# Patient Record
Sex: MALE | Race: WHITE | NOT HISPANIC OR LATINO | Employment: FULL TIME | URBAN - METROPOLITAN AREA
[De-identification: names, ages, dates, MRNs, and addresses within clinical notes are randomized per-mention and may not be internally consistent; named-entity substitution may affect disease eponyms.]

---

## 2017-10-13 ENCOUNTER — ALLSCRIPTS OFFICE VISIT (OUTPATIENT)
Dept: OTHER | Facility: OTHER | Age: 52
End: 2017-10-13

## 2017-10-14 NOTE — PROGRESS NOTES
Assessment  1  Urticaria (708 9) (L50 9)    Plan  Urticaria    · PredniSONE 20 MG Oral Tablet; TAKE 1 TABLET TWICE DAILY AFTER MEALS    Discussion/Summary    Zyrtec 10 mg a m    Benadryl 25 mg at as needed prednisone 20 mg twice a day for 5 days  Follow up in 1 week if no better  Advised to call if any changes  History of Present Illness  HPI: 5 day history of pruritic rash alleviated with Benadryl improved in a m  worsening as the day goes on  No recent illnesses no antibiotics  No changes in soaps or detergents  Current medications reviewed  labs 11/2016 see note  Review of Systems    Constitutional: no fever-- and-- no chills  ENT: no earache,-- no sore throat-- and-- no nasal discharge  Cardiovascular: no chest pain-- and-- no palpitations  Respiratory: no shortness of breath,-- no cough-- and-- no wheezing  Gastrointestinal: no nausea,-- no vomiting-- and-- no diarrhea  Musculoskeletal: no arthralgias-- and-- no myalgias  Neurological: no headache  Active Problems  1  Dyslipidemia (272 4) (E78 5)   2  Eczema (692 9) (L30 9)   3  Esophageal reflux (530 81) (K21 9)   4  Myofascial pain syndrome (729 1) (M79 1)   5  Rosacea (695 3) (L71 9)   6  Sleep disturbances (780 50) (G47 9)    Past Medical History  1  History of Gilbert's syndrome (277 4) (E80 4)   2  History of myocardial infarction (412) (I25 2)   3  History of snoring (V15 89) (Z87 898)   4  History of Inguinal hernia (550 90) (K40 90)   5  History of Mononucleosis (075) (B27 90)    Family History  Mother    1  Family history of hypertension (V17 49) (Z82 49)   2  Family history of Polyps Of The Sigmoid Colon  Father    3  Family history of Basal Cell Carcinoma Of The Skin  Sister    4  Family history of Breast Cancer (V16 3)  Brother    5  Family history of Basal Cell Carcinoma Of The Skin   6  Family history of asthma (V17 5) (Z82 5)    Social History   · Never A Smoker   · Occasional alcohol use    Surgical History  1   History of Cholecystectomy   2  History of Hernia Repair    Current Meds   1  Aspirin 81 MG TABS; Take 1 tablet daily Recorded   2  Elidel 1 % External Cream; APPLY THIN FILM TO AFFECTED AREA(S) ONCE DAILY; Therapy: 96QZH8873 to (Levon Porter)  Requested for: 90Fdq8335; Last   Rx:[id number] Ordered   3  PriLOSEC 20 MG CPDR; Therapy: (Recorded:97Dve8081) to Recorded    Allergies  1  No Known Drug Allergies    Vitals   Recorded: 83ZCB4868 08:33AM   Temperature 98 2 F   Heart Rate 82   Respiration 16   Systolic 275   Diastolic 76   Height 5 ft 9 in   Weight 211 lb    BMI Calculated 31 16   BSA Calculated 2 11     Physical Exam    Constitutional   General appearance: No acute distress, well appearing and well nourished  Eyes   Conjunctiva and lids: No erythema, swelling or discharge  Ears, Nose, Mouth, and Throat   Otoscopic examination: Tympanic membranes translucent with normal light reflex  Canals patent without erythema  Lips, teeth, and gums: Normal, good dentition  Oropharynx: Normal with no erythema, edema, exudate or lesions  Neck   Neck: Supple, symmetric, trachea midline, no masses  Thyroid: Normal, no thyromegaly  There were no thyroid nodules  Pulmonary   Respiratory effort: No increased work of breathing or signs of respiratory distress  Auscultation of lungs: Clear to auscultation  Cardiovascular   Auscultation of heart: Normal rate and rhythm, normal S1 and S2, no murmurs  Lymphatic   Palpation of lymph nodes in neck: No lymphadenopathy  Skin   Skin and subcutaneous tissue: Abnormal  -- Discrete urticarial lesions on right lower abdomen lower back and left leg        Results/Data  (1) CBC/PLT/DIFF 85SRC1580 12:00AM Grace Gibbs     Test Name Result Flag Reference   WBC COUNT 7 2       Summary / No summary entered :      No summary entered  Documents attached :      [address] Work - Fina Gonsalez Mini: [id number] - *Chart Update [facility] - -      (Unassigned) (Additional Information Document)  (1) COMPREHENSIVE METABOLIC PANEL 64LTG0121 64:99WZ Emerald Oh     Test Name Result Flag Reference   FWDMMCO,LXQRO 09       Summary / No summary entered :      No summary entered  Documents attached :      [address] Work - Danielle Durand Poag; Tiffany Villareal: [address] [place] [address], [address] [place] [address] (Internal Medicine) (Additional Information Document)  (1) LIPID PANEL, FASTING 09CRW1235 12:00AM Emerald Oh     Test Name Result Flag Reference   CHOLESTEROL 188     HDL,DIRECT 50     LDL CHOLESTEROL CALCULATED 114     TRIGLYCERIDES 120       Summary / No summary entered :      No summary entered  Documents attached :      [address] [place], Monalisa Madden; Enc: [address] [place] [address], [address] [place] [address] (Internal Medicine) (Additional Information Document)  (1) PSA (SCREEN) (Dx V76 44 Screen for Prostate Cancer) 52FJQ2857 12:00AM Emerald Oh     Test Name Result Flag Reference   PSA 1 3       Summary / No summary entered :      No summary entered  Documents attached :      [address] Work - Danielle Durand Poag; Tiffany Villareal: [address] [place] [address], [address] [place] [address] (Internal Medicine) (Additional Information Document)  Future Appointments    Date/Time Provider Specialty Site   11/13/2017 04:20 PM NATALIE Morgan   Cardiology [facility]     Signatures   Electronically signed by : Emilia Fothergill, M D ; Oct 13 2017  9:04AM EST                       (Author)

## 2017-11-13 ENCOUNTER — ALLSCRIPTS OFFICE VISIT (OUTPATIENT)
Dept: OTHER | Facility: OTHER | Age: 52
End: 2017-11-13

## 2017-11-13 DIAGNOSIS — E78.5 HYPERLIPIDEMIA: ICD-10-CM

## 2017-11-13 DIAGNOSIS — R94.39 ABNORMAL RESULT OF OTHER CARDIOVASCULAR FUNCTION STUDY (CODE): ICD-10-CM

## 2017-11-13 DIAGNOSIS — R94.31 ABNORMAL ELECTROCARDIOGRAM: ICD-10-CM

## 2017-11-14 NOTE — PROGRESS NOTES
Assessment  Assessed    1  Dyslipidemia (272 4) (E78 5)   2  Abnormal electrocardiography (794 31) (R94 31)   3  Abnormal nuclear stress test (794 39) (R94 39)    Plan  Abnormal electrocardiography, Abnormal nuclear stress test, Dyslipidemia    · Atorvastatin Calcium 10 MG Oral Tablet; Take 1 tablet daily   Rx By: Randolph Moon; Dispense: 0 Days ; #:90 Tablet; Refill: 0;For: Abnormal electrocardiography, Abnormal nuclear stress test, Dyslipidemia; WALESKA = N; Verified Transmission to [address]; Last Updated By: System, [employer]; 11/13/2017 4:47:35 PM   · (1) HEPATIC FUNCTION PANEL; Status:Active; Requested for:13Nov2017;    Perform:Merged with [facility]; BQO:[id number]; Ordered; For:Abnormal electrocardiography, Abnormal nuclear stress test, Dyslipidemia; Ordered By:Octavio Wade;   · (1) LIPID PANEL, FASTING; Status:Active; Requested for:13Nov2017;    Perform:Merged with [facility]; [facility]:[id number]; Ordered;electrocardiography, Abnormal nuclear stress test, Dyslipidemia; Ordered By:Octavio Wade;   · Follow-up visit in 1 year Evaluation and Treatment  Follow-up  Status: Complete  Done:13Nov2017   Ordered; For: Abnormal electrocardiography, Abnormal nuclear stress test, Dyslipidemia; Ordered By: Randolph Moon Performed:  Due: 94KSG5798; Last Updated By: [place]; 11/13/2017 4:48:56 PM  Dyslipidemia    · EKG/ECG- POC; Status:Complete;   Done: 61FCD7360   Perform: In Office; [phone]; Last Updated By:Scheier, Lindsey Pierce; 11/13/2017 4:33:57 PM;Ordered;Ordered By:Tywla Wade; Discussion/Summary  Cardiology Discussion Summary Free Text Note Form [facility]:   Abnormal EKG unchanged  Borderline abnormal stress test in 2013 with mild inferior ischemia versus artifact  Currently asymptomatic from the cardiac of view, lipids is still slightly suboptimal, favor low-dose to statin therapy   The long-term benefits of statin therapy were explained to the patient for reduction of microinfarction and need for revascularizations  We'll check a lipid profile and liver test given his Gilbert's disease in 2 months times  He is to let me know if he has any problems with tolerance  Chief Complaint  Chief Complaint Free Text Note Form: Pt  here for yearly follow up  Pt  has no complaints  History of Present Illness  Cardiology HPI Free Text Note Form [facility]: Cardiology follow-up  Overall doing well  States being active, denies chest pain or dyspnea  He states poorly compliant with a low cholesterol diet  She also suffers from mild obstructive sleep apnea, he uses machine intermittently because of development of nasal infections  His recent lipid profile still suboptimal, HDL 46,  with a total cholesterol 178  Review of Systems  Cardiology Male ROS:    Cardiac: No complaints of chest pain, no palpitations, no fainiting ,-- no chest pain,-- no rhythm problems,-- no fainting/blackouts,-- no heart murmur present,-- no signs of swelling-- and-- no palpitations present  Skin: No complaints of nonhealing sores or skin rash  Genitourinary: No complaints of recurrent urinary tract infections, frequent urination at night, difficult urination, blood in urine, kidney stones, loss of bladder control, no kidney or prostate problems, no erectile dysfunction  -- and-- no kidney problems  Psychological: No complaints of feeling depressed, anxiety, panic attacks, or difficulty concentrating  General: No complaints of trouble sleeping, lack of energy, fatigue, appetite changes, weight changes, fever, frequent infections, or night sweats  ,-- no trouble sleeping,-- no changes in weight-- and-- no lack of energy/fatigue  Respiratory: No complaints of shortness of breath, cough with sputum, or wheezing -- and-- no shortness of breath  HEENT: No complaints of serious problems, hearing problems, nose problems, throat problems, or snoring    Gastrointestinal: liver problems, but-- no abdonimal pain-- and-- no rectal bleeding-- Gilbert's disease  Neurological: No complaints of numbness, tingling, dizziness, weakness, seizures, headaches, syncope or fainting, AM fatigue, daytime sleepiness, no witnessed apnea episodes  Musculoskeletal: No complaints of arthritis, back pain, or painfull swelling  Active Problems  Problems    1  Dyslipidemia (272 4) (E78 5)   2  Eczema (692 9) (L30 9)   3  Esophageal reflux (530 81) (K21 9)   4  Myofascial pain syndrome (729 1) (M79 1)   5  Rosacea (695 3) (L71 9)   6  Sleep disturbances (780 50) (G47 9)   7  Urticaria (708 9) (L50 9)    Past Medical History  Problems    1  History of Colon cancer screening (V76 51) (Z12 11)   2  History of Elevated LFTs (790 6) (R79 89)   3  History of Encounter for preventive health examination (V70 0) (Z00 00)   4  History of Encounter for preventive health examination (V70 0) (Z00 00)   5  History of Gilbert's syndrome (277 4) (E80 4)   6  History of myocardial infarction (412) (I25 2)   7  History of snoring (V15 89) (Z87 898)   8  History of Inguinal hernia (550 90) (K40 90)   9  History of Mononucleosis (075) (B27 90)   10  History of Prostate cancer screening (V76 44) (Z12 5)  Active Problems And Past Medical History Reviewed: The active problems and past medical history were reviewed and updated today  Surgical History  Problems    1  History of Cholecystectomy   2  History of Hernia Repair  Surgical History Reviewed: The surgical history was reviewed and updated today  Family History  Mother    1  Family history of hypertension (V17 49) (Z82 49)   2  Family history of Polyps Of The Sigmoid Colon  Father    3  Family history of Basal Cell Carcinoma Of The Skin  Sister    4  Family history of Breast Cancer (V16 3)  Brother    5  Family history of Basal Cell Carcinoma Of The Skin   6  Family history of asthma (V17 5) (Z82 5)  Family History Reviewed: The family history was reviewed and updated today         Social History  Problems    · Never A Smoker   · Occasional alcohol use  Social History Reviewed: The social history was reviewed and is unchanged  Current Meds   1  Aspirin 81 MG TABS; Take 1 tablet daily Recorded   2  Elidel 1 % External Cream; APPLY THIN FILM TO AFFECTED AREA(S) ONCE DAILY; Therapy: [id number] to (Delma Mendez)  Requested for: 80Yea6128; Last Rx:[id number] Ordered   3  PriLOSEC 20 MG CPDR; Therapy: (Recorded:49Dse2594) to Recorded  Medication List Reviewed: The medication list was reviewed and updated today  Allergies  Medication    1  No Known Drug Allergies    Vitals  Vital Signs    Recorded: 77YCO0694 04:26PM   Heart Rate 84, Apical   Pulse Quality Regular, Apical   Systolic 800, RUE, Sitting   Diastolic 80, RUE, Sitting   Height 5 ft 9 in   Weight 207 lb    BMI Calculated 30 57   BSA Calculated 2 1       Physical Exam   Constitutional  General appearance: No acute distress, well appearing and well nourished  appears healthy,-- overweight,-- well hydrated-- and-- appearance reflects stated age  Neck  Neck and thyroid: Normal, supple, trachea midline, no thyromegaly  Jugular Veins: the JVP was not elevated  Pulmonary  Respiratory effort: No increased work of breathing or signs of respiratory distress  Auscultation of lungs: Clear to auscultation, no rales, no rhonchi, no wheezing, good air movement  Cardiovascular  Palpation of heart: Normal PMI, no thrills  The PMI was palpated at the 5th LICS in the midclavicular line  The apical impulse was normal  no precordial heave was noted  Auscultation of heart: Normal rate and rhythm, normal S1 and S2, no murmurs  The heart rate was normal  The rhythm was regular  Heart sounds: normal S1,-- normal S2,-- no gallop heard,-- no S3-- and-- no S4  No pericardial rub  no murmurs were heard  Carotid pulses: Normal, 2+ bilaterally  right 2+,-- no bruit heard over the right carotid,-- left 2+-- and-- no bruit heard over the left carotid    Pedal pulses: Normal, 2+ bilaterally  Examination of extremities for edema and/or varicosities: Normal    Chest - Chest: Normal   Neurologic - Speech: Normal    Psychiatric - Orientation to person, place, and time: Normal -- Mood and affect: Normal       Results/Data  ECG Report:  Rhythm and rate:  normal sinus rhythm  Q-waves are present in lead(s) III, AVF  The findings are consistent with a myocardial infarction of the inferior wall  Comparison to prior ECGs: no interval change        Signatures   Electronically signed by : NATALIE Beltran ; Nov 13 2017  4:51PM EST                       (Author)

## 2017-12-22 ENCOUNTER — ALLSCRIPTS OFFICE VISIT (OUTPATIENT)
Dept: OTHER | Facility: OTHER | Age: 52
End: 2017-12-22

## 2017-12-22 DIAGNOSIS — J20.0 ACUTE BRONCHITIS DUE TO MYCOPLASMA PNEUMONIAE: ICD-10-CM

## 2017-12-23 NOTE — PROGRESS NOTES
Assessment   1  Acute bronchitis due to Mycoplasma pneumoniae (466 0,041 81) (J20 0)    Plan   Acute bronchitis due to Mycoplasma pneumoniae    · Azithromycin 250 MG Oral Tablet; TAKE 2 TABLETS ON DAY 1 THEN TAKE 1    TABLET A DAY FOR 4 DAYS   · Benzonatate 100 MG Oral Capsule; 1 PO Q 8 HOURS FOR COUGH AND TWO AT    HS   · * XR CHEST PA & LATERAL; Status:Active; Requested for:97Dmy6726;     Discussion/Summary      ADD ZPAK TESSASLON MUCINEX OTC IF NO BETTER AND GET CXR      Chief Complaint   1  Cough   2  Ear Pain   3  Nasal Symptoms  COUGHING SINCE THANKSGIVING- DRY COUGH- LAST FEW DAYS MORE CONGESTION, GREEN MUCOUSE IN THE AM; WORSE TODAY      History of Present Illness   HPI: CHEST CONGESTION, PURULENT SPUUTUM, PRESSURE IN HIS EARS; COUGHING WITH PURUELNT SPUTUM;    Ear Pain: Alexandrea Vega presents with complaints of ear pain  Associated symptoms include otalgia-- and-- ear pressure  Nasal Symptoms: Alexandrea Vega presents with complaints of nasal symptoms  Associated symptoms include nasal congestion,-- clear nasal discharge,-- purulent nasal discharge,-- postnasal drainage-- and-- ear discomfort, but-- no itchy nose,-- no nasal obstruction,-- no epistaxis,-- no hyposmia,-- no fever,-- no headache-- and-- no hoarseness  The patient presents with complaints of gradual onset of moderate cough, described as hacking  Cough: Alexandrea Vega presents with complaints of cough  Associated symptoms include stuffy nose, but-- no dyspnea,-- no wheezing,-- no chills,-- no fever,-- no runny nose,-- no sore throat,-- no myalgias-- and-- no pleuritic chest pain  Review of Systems        Constitutional: feeling poorly-- and-- feeling tired, but-- as noted in HPI,-- no fever-- and-- no chills  ENT: earache,-- nasal discharge-- and-- hoarseness, but-- as noted in HPI,-- no nosebleeds-- and-- no sore throat        Cardiovascular: no complaints of slow or fast heart rate, no chest pain, no palpitations, no leg claudication or lower extremity edema,-- the heart rate was not slow-- and-- the heart rate was not fast       Respiratory: cough, but-- no complaints of shortness of breath, no wheezing or cough, no dyspnea on exertion, no orthopnea or PND,-- as noted in HPI,-- no shortness of breath,-- no wheezing-- and-- no shortness of breath during exertion  Genitourinary: no complaints of dysuria or incontinence, no hesitancy, no nocturia, no genital lesion, no inadequacy of penile erection  Musculoskeletal: no complaints of arthralgia, no myalgia, no joint swelling or stiffness, no limb pain or swelling,-- no arthralgias-- and-- no myalgias  Integumentary: no complaints of skin rash or lesion, no itching or dry skin, no skin wounds-- and-- no skin lesions  Neurological: no complaints of headache, no confusion, no numbness or tingling, no dizziness or fainting  ROS reviewed  Active Problems   1  Abnormal electrocardiography (794 31) (R94 31)   2  Abnormal nuclear stress test (794 39) (R94 39)   3  Dyslipidemia (272 4) (E78 5)   4  Eczema (692 9) (L30 9)   5  Esophageal reflux (530 81) (K21 9)   6  Myofascial pain syndrome (729 1) (M79 1)   7  Need for immunization against influenza (V04 81) (Z23)   8  Rosacea (695 3) (L71 9)   9  Sleep disturbances (780 50) (G47 9)   10  Urticaria (708 9) (L50 9)    Past Medical History   Active Problems And Past Medical History Reviewed: The active problems and past medical history were reviewed and updated today  Social History    · Never A Smoker   · Occasional alcohol use  The social history was reviewed and updated today  The social history was reviewed and is unchanged  Family History   Family History Reviewed: The family history was reviewed and updated today  Current Meds    1  Aspirin 81 MG TABS; Take 1 tablet daily Recorded   2  Atorvastatin Calcium 10 MG Oral Tablet; Take 1 tablet daily;      Therapy: 82FDY1916 to (Last Rx:87Crh1767)  Requested for: 35XNU1622 Ordered   3  Elidel 1 % External Cream; APPLY THIN FILM TO AFFECTED AREA(S) ONCE DAILY; Therapy: 41JWA8973 to ([place] Pressley)  Requested for: 23Krg6386; Last     Rx:89Pgy8199 Ordered   4  PriLOSEC 20 MG CPDR; Therapy: (Recorded:43Ozv3946) to Recorded    Allergies   1  No Known Drug Allergies    Vitals    Recorded: 22Dec2017 11:56AM   Temperature 96 6 F   Heart Rate 76   Respiration 16   Systolic 430, LUE, Sitting   Diastolic 76, LUE, Sitting   BP CUFF SIZE Large   Height 5 ft 9 in   Weight 201 lb    BMI Calculated 29 68   BSA Calculated 2 07     Physical Exam        Constitutional      General appearance: Abnormal  -- PT WITH HARSH COUGH  Eyes      Conjunctiva and lids: No swelling, erythema, or discharge  Pupils and irises: Equal, round and reactive to light  Ears, Nose, Mouth, and Throat      External inspection of ears and nose: Normal        Otoscopic examination: Tympanic membrance translucent with normal light reflex  Canals patent without erythema  Nasal mucosa, septum, and turbinates: Abnormal  -- NASAL CONGESTION  Oropharynx: Abnormal  -- POST NASAL DRIP  Pulmonary      Respiratory effort: No increased work of breathing or signs of respiratory distress  Auscultation of lungs: Abnormal  -- RONCHI NOTED B/L MID LOBES; NO RALES NO WHEEZE  Cardiovascular      Palpation of heart: Normal PMI, no thrills  Auscultation of heart: Normal rate and rhythm, normal S1 and S2, without murmurs  Examination of extremities for edema and/or varicosities: Normal        Lymphatic      Palpation of lymph nodes in neck: No lymphadenopathy  Signatures    Electronically signed by :  Sj [address] [place] [address]; Dec 22 2017 12:18PM EST                       (Author)

## 2018-01-13 VITALS
SYSTOLIC BLOOD PRESSURE: 126 MMHG | HEART RATE: 84 BPM | BODY MASS INDEX: 30.66 KG/M2 | DIASTOLIC BLOOD PRESSURE: 80 MMHG | HEIGHT: 69 IN | WEIGHT: 207 LBS

## 2018-01-14 VITALS
TEMPERATURE: 98.2 F | RESPIRATION RATE: 16 BRPM | WEIGHT: 211 LBS | HEIGHT: 69 IN | HEART RATE: 82 BPM | DIASTOLIC BLOOD PRESSURE: 76 MMHG | SYSTOLIC BLOOD PRESSURE: 120 MMHG | BODY MASS INDEX: 31.25 KG/M2

## 2018-01-17 NOTE — PROGRESS NOTES
Assessment    1  Encounter for preventive health examination (V70 0) (Z00 00)   2  Encounter for preventive health examination (V70 0) (Z00 00)   3  Esophageal reflux (530 81) (K21 9)    Plan  Colon cancer screening    · COLONOSCOPY; Status:Active; Requested for:28Nov2016;   Dyslipidemia, Esophageal reflux    · (1) LIPID PANEL, FASTING; Status:Active; Requested for:28Nov2016;   Esophageal reflux    · (1) CBC/PLT/DIFF; Status:Active; Requested for:28Nov2016;    · (1) COMPREHENSIVE METABOLIC PANEL; Status:Active; Requested for:28Nov2016;   Esophageal reflux, Prostate cancer screening    · (1) PSA (SCREEN) (Dx V76 44 Screen for Prostate Cancer); Status:Active; Requested  for:28Nov2016;   Need for prophylactic vaccination and inoculation against influenza    · Fluzone Quadrivalent Intramuscular Suspension    Discussion/Summary  Impression: health maintenance visit  Currently, he eats a healthy diet  Prostate cancer screening: PSA was ordered and PSA testing is needed every YEAR  Testicular cancer screening: monthly self testicular exam was advised  Colorectal cancer screening: colonoscopy has been ordered  Screening lab work includes glucose, lipid profile and 25-hydroxyvitamin D  The immunizations are up to date  Advice and education were given regarding aerobic exercise, weight loss, calcium supplements and cardiovascular risk reduction  Patient discussion: discussed with the patient  PATIETN TO OBTAIN LABS NOW  FOLLOW UP IN ONE YEAR  PT ON CPAP - DOING WELL- SEES DR Dileep Mclaughlin  REFER FOR COLONOSCOPY  UPDATE FLU SHOT TODAY;     FOLLOW UP ONE YEAR OR SOONER IF ANY ISSUES  FLU SHOT TODAY  Chief Complaint  PATIENT HERE FOR ANNUAL EXAM  HE WAS SEEN BY CARDIOLOGY- NOTES REVIEWED      History of Present Illness  HM, Adult Male: The patient is being seen for a health maintenance and PT HAS SEEN CARDIOLOGY evaluation  The last health maintenance visit was 12 month(s) ago     Social History: Household members include spouse  He is   Work status: working full time  The patient has never smoked cigarettes  He reports never drinking alcohol  General Health: The patient's health since the last visit is described as good  He has regular dental visits  He denies vision problems  He denies hearing loss  Immunizations status: not up to date  Lifestyle:  He consumes a diverse and healthy diet  He does not have any weight concerns  He exercises regularly  He does not use tobacco  He denies alcohol use  Screening: cancer screening reviewed and current  metabolic screening reviewed and current  risk screening reviewed and current  HPI: PATIENT HERE FOR ANNUAL EXAM  HE HAS HAD ABNORMAL STRESSTEST  HAS BEEN SEEN BY CARDIOLOLGY       Welcome to Medicare and Wellness Visits: The patient reports his health to be good  Review of Systems    Constitutional: No fever or chills, feels well, no tiredness, no recent weight gain or weight loss, not feeling poorly and not feeling tired  Eyes: No complaints of eye pain, no red eyes, no discharge from eyes, no itchy eyes, no eyesight problems and no purulent discharge from the eyes  ENT: no complaints of earache, no hearing loss, no nosebleeds, no nasal discharge, no sore throat, no hoarseness, no earache, no sore throat, no hearing loss, no nasal discharge and no hoarseness  Cardiovascular: No complaints of slow heart rate, no fast heart rate, no chest pain, no palpitations, no leg claudication, no lower extremity, the heart rate was not slow, no chest pain, no intermittent leg claudication, the heart rate was not fast, no palpitations and no extremity edema  Respiratory: No complaints of shortness of breath, no wheezing, no cough, no SOB on exertion, no orthopnea or PND, no cough and no shortness of breath during exertion     Gastrointestinal: No complaints of abdominal pain, no constipation, no nausea or vomiting, no diarrhea or bloody stools, no abdominal pain, no nausea, no constipation and no diarrhea  Genitourinary: No complaints of dysuria, no incontinence, no hesitancy, no nocturia, no genital lesion, no testicular pain, no dysuria and no incontinence  Musculoskeletal: No complaints of arthralgia, no myalgias, no joint swelling or stiffness, no limb pain or swelling, no joint swelling and no joint stiffness  Integumentary: No complaints of skin rash or skin lesions, no itching, no skin wound, no dry skin, no rashes, no skin lesions and no skin wound  Neurological: No compliants of headache, no confusion, no convulsions, no numbness or tingling, no dizziness or fainting, no limb weakness, no difficulty walking, no headache, no numbness, no tingling, no confusion, no dizziness, no limb weakness, no convulsions and no difficulty walking  Psychiatric: Is not suicidal, no sleep disturbances, no anxiety or depression, no change in personality, no emotional problems, no anxiety, no personality change and no depression  Endocrine: No complaints of proptosis, no hot flashes, no muscle weakness, no erectile dysfunction, no deepening of the voice, no feelings of weakness and no muscle weakness  Hematologic/Lymphatic: No complaints of swollen glands, no swollen glands in the neck, does not bleed easily, no easy bruising, no tendency for easy bleeding and no tendency for easy bruising  ROS reviewed  Active Problems    1  Abnormal EKG (794 31) (R94 31)   2  Abnormal nuclear stress test (794 39) (R94 39)   3  Colon cancer screening (V76 51) (Z12 11)   4  Dyslipidemia (272 4) (E78 5)   5  Eczema (692 9) (L30 9)   6  Encounter for preventive health examination (V70 0) (Z00 00)   7  Encounter for preventive health examination (V70 0) (Z00 00)   8  Esophageal reflux (530 81) (K21 9)   9  Myofascial pain syndrome (729 1) (M79 1)   10  Need for prophylactic vaccination and inoculation against influenza (V04 81) (Z23)   11   Prostate cancer screening (V76 44) (Z12 5)   12  Rosacea (695 3) (L71 9)   13  Sleep disturbances (780 50) (G47 9)    Past Medical History    · History of Gilbert's syndrome (277 4) (E80 4)   · History of myocardial infarction (412) (I25 2)   · History of snoring (V15 89) (Z87 898)   · History of Inguinal hernia (550 90) (K40 90)   · History of Mononucleosis (075) (B27 90)    Surgical History    · History of Cholecystectomy   · History of Hernia Repair    Family History  Mother    · Family history of hypertension (V17 49) (Z82 49)   · Family history of Polyps Of The Sigmoid Colon  Father    · Family history of Basal Cell Carcinoma Of The Skin  Sister    · Family history of Breast Cancer (V16 3)  Brother    · Family history of Basal Cell Carcinoma Of The Skin   · Family history of asthma (V17 5) (Z82 5)    Social History    · Never A Smoker   · Occasional alcohol use    Current Meds   1  Aspirin 81 MG TABS; Take 1 tablet daily Recorded   2  Elidel 1 % External Cream; APPLY THIN FILM TO AFFECTED AREA(S) ONCE DAILY; Therapy: [id number] to ([place])  Requested for: 84Zdo6596; Last   Rx:[id number] Ordered   3  PriLOSEC 20 MG Oral Capsule Delayed Release (Omeprazole); Therapy: (Recorded:94Zap6944) to Recorded    Allergies    1  No Known Drug Allergies    Vitals   Recorded: 33CYL0972 08:06AM   Temperature 96 3 F, Tympanic   Heart Rate 76   Respiration 16   Systolic 850, LUE, Sitting   Diastolic 78, LUE, Sitting   Height 5 ft 9 in   Weight 210 lb 8 0 oz   BMI Calculated 31 09   BSA Calculated 2 11     Physical Exam    Constitutional   General appearance: No acute distress, well appearing and well nourished  Eyes   Conjunctiva and lids: No erythema, swelling or discharge  Pupils and irises: Equal, round, reactive to light  Ears, Nose, Mouth, and Throat   External inspection of ears and nose: Normal     Otoscopic examination: Tympanic membranes translucent with normal light reflex  Canals patent without erythema      Hearing: Normal  Nasal mucosa, septum, and turbinates: Normal without edema or erythema  Oropharynx: Normal with no erythema, edema, exudate or lesions  Neck   Neck: Supple, symmetric, trachea midline, no masses  Thyroid: Normal, no thyromegaly  NO NODULES;    Pulmonary   Respiratory effort: No increased work of breathing or signs of respiratory distress  Percussion of chest: Normal     Palpation of chest: Normal     Auscultation of lungs: Clear to auscultation  Auscultation of the lungs revealed no expiratory wheezing, normal expiratory time and no inspiratory wheezing  no rales or crackles were heard bilaterally  no rhonchi  no friction rub  no wheezing  no diminished breath sounds  no bronchial breath sounds  CLEAR B/L  Cardiovascular   Palpation of heart: Normal PMI, no thrills  Auscultation of heart: Normal rate and rhythm, normal S1 and S2, no murmurs  REGULAR  Carotid pulses: 2+ bilaterally  NO BRUITS NOTED  Abdominal aorta: Normal     Pedal pulses: 2+ bilaterally  Examination of extremities for edema and/or varicosities: Normal     Abdomen   Abdomen: Non-tender, no masses  Liver and spleen: No hepatomegaly or splenomegaly  Lymphatic   Palpation of lymph nodes in neck: No lymphadenopathy  Musculoskeletal   Gait and station: Normal     Inspection/palpation of digits and nails: Normal without clubbing or cyanosis  Inspection/palpation of joints, bones, and muscles: Normal     Range of motion: Normal     Stability: Normal     Muscle strength/tone: Normal     Skin   Skin and subcutaneous tissue: Normal without rashes or lesions  Palpation of skin and subcutaneous tissue: Normal turgor  Neurologic   Cranial nerves: Cranial nerves 2-12 intact  Reflexes: 2+ and symmetric  Sensation: No sensory loss  Psychiatric   Judgment and insight: Normal     Orientation to person, place and time: Normal     Recent and remote memory: Intact      Mood and affect: Normal  Results/Data  PHQ-2 Adult Depression Screening 28Nov2016 08:41AM User, Ahs     Test Name Result Flag Reference   PHQ-2 Adult Depression Score 0     Over the last two weeks, how often have you been bothered by any of the following problems? Little interest or pleasure in doing things: Not at all - 0  Feeling down, depressed, or hopeless: Not at all - 0   PHQ-2 Adult Depression Screening Negative         Signatures   Electronically signed by :  Sj Kate, DO; Nov 28 2016  4:38PM EST                       (Author)

## 2018-01-22 VITALS
HEIGHT: 69 IN | HEART RATE: 76 BPM | RESPIRATION RATE: 16 BRPM | SYSTOLIC BLOOD PRESSURE: 124 MMHG | DIASTOLIC BLOOD PRESSURE: 76 MMHG | TEMPERATURE: 96.6 F | BODY MASS INDEX: 29.77 KG/M2 | WEIGHT: 201 LBS

## 2018-12-11 ENCOUNTER — OFFICE VISIT (OUTPATIENT)
Dept: CARDIOLOGY CLINIC | Facility: CLINIC | Age: 53
End: 2018-12-11
Payer: COMMERCIAL

## 2018-12-11 VITALS
HEIGHT: 69 IN | DIASTOLIC BLOOD PRESSURE: 84 MMHG | SYSTOLIC BLOOD PRESSURE: 130 MMHG | HEART RATE: 66 BPM | WEIGHT: 200 LBS | BODY MASS INDEX: 29.62 KG/M2

## 2018-12-11 DIAGNOSIS — E78.00 HYPERCHOLESTEROLEMIA: Primary | ICD-10-CM

## 2018-12-11 DIAGNOSIS — I25.10 CORONARY ARTERY DISEASE INVOLVING NATIVE CORONARY ARTERY OF NATIVE HEART WITHOUT ANGINA PECTORIS: ICD-10-CM

## 2018-12-11 PROCEDURE — 99213 OFFICE O/P EST LOW 20 MIN: CPT | Performed by: INTERNAL MEDICINE

## 2018-12-11 PROCEDURE — 93000 ELECTROCARDIOGRAM COMPLETE: CPT | Performed by: INTERNAL MEDICINE

## 2018-12-11 RX ORDER — PIMECROLIMUS 10 MG/G
CREAM TOPICAL DAILY
COMMUNITY
Start: 2015-07-22

## 2018-12-11 RX ORDER — CLOTRIMAZOLE AND BETAMETHASONE DIPROPIONATE 10; .64 MG/G; MG/G
CREAM TOPICAL
Refills: 0 | COMMUNITY
Start: 2018-10-17 | End: 2020-10-06 | Stop reason: ALTCHOICE

## 2018-12-11 NOTE — PROGRESS NOTES
Cardiology Follow Up    Macario Glez  1965  [id number]  [address] [facility]  [address] [place], [place] [address] [address]  [phone]    1  Hypercholesterolemia     2  Coronary artery disease involving native coronary artery of native heart without angina pectoris  POCT ECG    Stress test only, exercise       Interval History:   Cardiology follow-up  Continues to do well  Occasionally he feels some twinges in the left side the the chest and abdomen as well  No prolonged episodes  He is active but not exercising regularly, states been compliant with low-cholesterol diet  Patient Active Problem List   Diagnosis    Hypercholesterolemia    Coronary artery disease involving native coronary artery of native heart without angina pectoris     No past medical history on file  Social History     Social History    Marital status: /Civil Union     Spouse name: N/A    Number of children: N/A    Years of education: N/A     Occupational History    Not on file  Social History Main Topics    Smoking status: Never Smoker    Smokeless tobacco: Never Used    Alcohol use Not on file    Drug use: Unknown    Sexual activity: Not on file     Other Topics Concern    Not on file     Social History Narrative    No narrative on file      No family history on file  No past surgical history on file  Current Outpatient Prescriptions:     aspirin 81 MG tablet, , Disp: , Rfl:     clotrimazole-betamethasone (LOTRISONE) 1-0 05 % cream, , Disp: , Rfl: 0    Omeprazole Magnesium (PRILOSEC OTC PO), , Disp: , Rfl:     pimecrolimus (ELIDEL) 1 % cream, Apply topically daily, Disp: , Rfl:   Allergies   Allergen Reactions    Iv Dye [Iodinated Diagnostic Agents]      hives       Labs:  No visits with results within 6 Month(s) from this visit     Latest known visit with results is:   Generic Conversion - Encounter on 12/06/2016 Component Date Value    Cholesterol 11/28/2016 188     HDL 11/28/2016 50     LDL Calculated 11/28/2016 114     Triglycerides 11/28/2016 120     WBC 12/06/2016 7 2     Glucose 11/28/2016 88     PSA 11/28/2016 1 3      Imaging: No results found  Review of Systems:  Review of Systems   Constitutional: Negative for activity change and fatigue  Respiratory: Negative for apnea, shortness of breath, wheezing and stridor  Cardiovascular: Negative for chest pain, palpitations and leg swelling  Neurological: Negative for syncope  Psychiatric/Behavioral: Negative for sleep disturbance  Physical Exam:  Physical Exam   Constitutional: He is oriented to person, place, and time  He appears well-developed  No distress  Eyes: No scleral icterus  Neck: No JVD present  Cardiovascular: Normal rate and intact distal pulses  Exam reveals no gallop and no friction rub  No murmur heard  Pulmonary/Chest: Effort normal and breath sounds normal  No respiratory distress  He has no wheezes  He has no rales  Neurological: He is alert and oriented to person, place, and time  Skin: He is not diaphoretic  Psychiatric: He has a normal mood and affect  Discussion/Summary:  Abnormal EKG, suggestive of old inferior infarct, unchanged, stress test 2013 revealed very mild inferior ischemia versus artifact  Manage medically  He does have Gilbert's syndrome    Will repeat a stress test   Repeat lipid profile as well

## 2018-12-17 ENCOUNTER — HOSPITAL ENCOUNTER (OUTPATIENT)
Dept: NON INVASIVE DIAGNOSTICS | Facility: CLINIC | Age: 53
Discharge: HOME | End: 2018-12-17
Payer: COMMERCIAL

## 2018-12-17 DIAGNOSIS — I25.10 CORONARY ARTERY DISEASE INVOLVING NATIVE CORONARY ARTERY OF NATIVE HEART WITHOUT ANGINA PECTORIS: ICD-10-CM

## 2018-12-17 PROCEDURE — 93018 CV STRESS TEST I&R ONLY: CPT | Performed by: INTERNAL MEDICINE

## 2018-12-17 PROCEDURE — 93017 CV STRESS TEST TRACING ONLY: CPT

## 2018-12-17 PROCEDURE — 93016 CV STRESS TEST SUPVJ ONLY: CPT | Performed by: INTERNAL MEDICINE

## 2018-12-21 LAB
ARRHY DURING EX: NORMAL
MAX DIASTOLIC BP: 98 MMHG
MAX HEART RATE: 184 BPM
MAX PREDICTED HEART RATE: 165 BPM
MAX. SYSTOLIC BP: 160 MMHG
PROTOCOL NAME: NORMAL
REASON FOR TERMINATION: NORMAL
TARGET HR FORMULA: NORMAL
TEST INDICATION: NORMAL
TIME IN EXERCISE PHASE: NORMAL

## 2018-12-26 LAB
CHOLEST SERPL-MCNC: 199 MG/DL
CHOLEST/HDLC SERPL: 3.6 (CALC)
HDLC SERPL-MCNC: 55 MG/DL
LDLC SERPL CALC-MCNC: 124 MG/DL (CALC)
NONHDLC SERPL-MCNC: 144 MG/DL (CALC)
TRIGL SERPL-MCNC: 92 MG/DL

## 2018-12-27 ENCOUNTER — TELEPHONE (OUTPATIENT)
Dept: CARDIOLOGY CLINIC | Facility: CLINIC | Age: 53
End: 2018-12-27

## 2018-12-27 NOTE — TELEPHONE ENCOUNTER
Pt stated he would try a medication   then decided to try lowering his results with diet and exercise  Advised to c/b if he changes his mind  Do you want pt to retest in 6 months?

## 2018-12-27 NOTE — TELEPHONE ENCOUNTER
----- Message from Kalpana Lema MD sent at 12/27/2018  7:36 AM EST -----  Please call the patient regarding his abnormal result  consider statin

## 2019-05-06 ENCOUNTER — OFFICE VISIT (OUTPATIENT)
Dept: FAMILY MEDICINE CLINIC | Facility: CLINIC | Age: 54
End: 2019-05-06
Payer: COMMERCIAL

## 2019-05-06 VITALS
HEIGHT: 69 IN | TEMPERATURE: 98.5 F | DIASTOLIC BLOOD PRESSURE: 80 MMHG | BODY MASS INDEX: 30.36 KG/M2 | WEIGHT: 205 LBS | SYSTOLIC BLOOD PRESSURE: 120 MMHG

## 2019-05-06 DIAGNOSIS — L50.9 URTICARIA: Primary | ICD-10-CM

## 2019-05-06 PROCEDURE — 1111F DSCHRG MED/CURRENT MED MERGE: CPT | Performed by: FAMILY MEDICINE

## 2019-05-06 PROCEDURE — 3008F BODY MASS INDEX DOCD: CPT | Performed by: FAMILY MEDICINE

## 2019-05-06 PROCEDURE — 1036F TOBACCO NON-USER: CPT | Performed by: FAMILY MEDICINE

## 2019-05-06 PROCEDURE — 99213 OFFICE O/P EST LOW 20 MIN: CPT | Performed by: FAMILY MEDICINE

## 2019-05-06 RX ORDER — PREDNISONE 20 MG/1
20 TABLET ORAL 2 TIMES DAILY WITH MEALS
Qty: 10 TABLET | Refills: 0 | Status: SHIPPED | OUTPATIENT
Start: 2019-05-06 | End: 2019-05-11

## 2019-05-17 ENCOUNTER — TELEPHONE (OUTPATIENT)
Dept: FAMILY MEDICINE CLINIC | Facility: CLINIC | Age: 54
End: 2019-05-17

## 2019-05-17 DIAGNOSIS — L50.9 URTICARIA: Primary | ICD-10-CM

## 2019-05-17 RX ORDER — PREDNISONE 20 MG/1
20 TABLET ORAL 2 TIMES DAILY WITH MEALS
Qty: 10 TABLET | Refills: 0 | Status: SHIPPED | OUTPATIENT
Start: 2019-05-17 | End: 2019-05-22

## 2020-10-06 ENCOUNTER — OFFICE VISIT (OUTPATIENT)
Dept: FAMILY MEDICINE CLINIC | Facility: CLINIC | Age: 55
End: 2020-10-06
Payer: COMMERCIAL

## 2020-10-06 VITALS
DIASTOLIC BLOOD PRESSURE: 72 MMHG | SYSTOLIC BLOOD PRESSURE: 124 MMHG | HEIGHT: 69 IN | WEIGHT: 207 LBS | RESPIRATION RATE: 16 BRPM | BODY MASS INDEX: 30.66 KG/M2 | HEART RATE: 76 BPM | TEMPERATURE: 96.4 F

## 2020-10-06 DIAGNOSIS — Z12.11 SCREENING FOR COLORECTAL CANCER: ICD-10-CM

## 2020-10-06 DIAGNOSIS — E78.00 HYPERCHOLESTEROLEMIA: ICD-10-CM

## 2020-10-06 DIAGNOSIS — Z11.59 NEED FOR HEPATITIS C SCREENING TEST: ICD-10-CM

## 2020-10-06 DIAGNOSIS — Z23 ENCOUNTER FOR IMMUNIZATION: ICD-10-CM

## 2020-10-06 DIAGNOSIS — Z12.12 SCREENING FOR COLORECTAL CANCER: ICD-10-CM

## 2020-10-06 DIAGNOSIS — Z12.5 SCREENING FOR PROSTATE CANCER: ICD-10-CM

## 2020-10-06 DIAGNOSIS — Z00.00 ANNUAL PHYSICAL EXAM: Primary | ICD-10-CM

## 2020-10-06 DIAGNOSIS — K21.9 GASTROESOPHAGEAL REFLUX DISEASE, UNSPECIFIED WHETHER ESOPHAGITIS PRESENT: ICD-10-CM

## 2020-10-06 DIAGNOSIS — Z11.4 SCREENING FOR HIV (HUMAN IMMUNODEFICIENCY VIRUS): ICD-10-CM

## 2020-10-06 PROCEDURE — 90682 RIV4 VACC RECOMBINANT DNA IM: CPT | Performed by: FAMILY MEDICINE

## 2020-10-06 PROCEDURE — 3725F SCREEN DEPRESSION PERFORMED: CPT | Performed by: PHYSICIAN ASSISTANT

## 2020-10-06 PROCEDURE — 99396 PREV VISIT EST AGE 40-64: CPT | Performed by: PHYSICIAN ASSISTANT

## 2020-10-06 PROCEDURE — 90471 IMMUNIZATION ADMIN: CPT | Performed by: FAMILY MEDICINE

## 2020-10-06 PROCEDURE — 1036F TOBACCO NON-USER: CPT | Performed by: PHYSICIAN ASSISTANT

## 2020-10-06 RX ORDER — TOBRAMYCIN AND DEXAMETHASONE 3; 1 MG/ML; MG/ML
1 SUSPENSION/ DROPS OPHTHALMIC AS NEEDED
COMMUNITY
Start: 2020-07-08

## 2020-10-06 RX ORDER — ERYTHROMYCIN 5 MG/G
1 OINTMENT OPHTHALMIC
COMMUNITY
Start: 2020-08-26

## 2020-10-07 LAB
ALBUMIN SERPL-MCNC: 4.3 G/DL (ref 3.6–5.1)
ALBUMIN/GLOB SERPL: 1.7 (CALC) (ref 1–2.5)
ALP SERPL-CCNC: 82 U/L (ref 35–144)
ALT SERPL-CCNC: 35 U/L (ref 9–46)
AST SERPL-CCNC: 21 U/L (ref 10–35)
BASOPHILS # BLD AUTO: 52 CELLS/UL (ref 0–200)
BASOPHILS NFR BLD AUTO: 1.1 %
BILIRUB SERPL-MCNC: 1.6 MG/DL (ref 0.2–1.2)
BUN SERPL-MCNC: 15 MG/DL (ref 7–25)
BUN/CREAT SERPL: ABNORMAL (CALC) (ref 6–22)
CALCIUM SERPL-MCNC: 9.4 MG/DL (ref 8.6–10.3)
CHLORIDE SERPL-SCNC: 106 MMOL/L (ref 98–110)
CHOLEST SERPL-MCNC: 172 MG/DL
CHOLEST/HDLC SERPL: 3.4 (CALC)
CO2 SERPL-SCNC: 28 MMOL/L (ref 20–32)
CREAT SERPL-MCNC: 1.08 MG/DL (ref 0.7–1.33)
EOSINOPHIL # BLD AUTO: 381 CELLS/UL (ref 15–500)
EOSINOPHIL NFR BLD AUTO: 8.1 %
ERYTHROCYTE [DISTWIDTH] IN BLOOD BY AUTOMATED COUNT: 13 % (ref 11–15)
GLOBULIN SER CALC-MCNC: 2.6 G/DL (CALC) (ref 1.9–3.7)
GLUCOSE SERPL-MCNC: 90 MG/DL (ref 65–99)
HCT VFR BLD AUTO: 45.3 % (ref 38.5–50)
HDLC SERPL-MCNC: 51 MG/DL
HGB BLD-MCNC: 15 G/DL (ref 13.2–17.1)
LDLC SERPL CALC-MCNC: 103 MG/DL (CALC)
LYMPHOCYTES # BLD AUTO: 1105 CELLS/UL (ref 850–3900)
LYMPHOCYTES NFR BLD AUTO: 23.5 %
MCH RBC QN AUTO: 29.2 PG (ref 27–33)
MCHC RBC AUTO-ENTMCNC: 33.1 G/DL (ref 32–36)
MCV RBC AUTO: 88.3 FL (ref 80–100)
MONOCYTES # BLD AUTO: 371 CELLS/UL (ref 200–950)
MONOCYTES NFR BLD AUTO: 7.9 %
NEUTROPHILS # BLD AUTO: 2792 CELLS/UL (ref 1500–7800)
NEUTROPHILS NFR BLD AUTO: 59.4 %
NONHDLC SERPL-MCNC: 121 MG/DL (CALC)
PLATELET # BLD AUTO: 223 THOUSAND/UL (ref 140–400)
PMV BLD REES-ECKER: 11 FL (ref 7.5–12.5)
POTASSIUM SERPL-SCNC: 4.9 MMOL/L (ref 3.5–5.3)
PROT SERPL-MCNC: 6.9 G/DL (ref 6.1–8.1)
PSA SERPL-MCNC: 0.9 NG/ML
RBC # BLD AUTO: 5.13 MILLION/UL (ref 4.2–5.8)
SL AMB EGFR AFRICAN AMERICAN: 88 ML/MIN/1.73M2
SL AMB EGFR NON AFRICAN AMERICAN: 76 ML/MIN/1.73M2
SODIUM SERPL-SCNC: 140 MMOL/L (ref 135–146)
TRIGL SERPL-MCNC: 88 MG/DL
WBC # BLD AUTO: 4.7 THOUSAND/UL (ref 3.8–10.8)

## 2023-02-06 ENCOUNTER — OFFICE VISIT (OUTPATIENT)
Dept: FAMILY MEDICINE CLINIC | Facility: CLINIC | Age: 58
End: 2023-02-06

## 2023-02-06 VITALS
OXYGEN SATURATION: 99 % | TEMPERATURE: 97.4 F | SYSTOLIC BLOOD PRESSURE: 110 MMHG | HEIGHT: 69 IN | HEART RATE: 90 BPM | WEIGHT: 207.5 LBS | DIASTOLIC BLOOD PRESSURE: 78 MMHG | BODY MASS INDEX: 30.73 KG/M2

## 2023-02-06 DIAGNOSIS — L50.9 URTICARIA: ICD-10-CM

## 2023-02-06 DIAGNOSIS — J02.9 PHARYNGITIS, UNSPECIFIED ETIOLOGY: Primary | ICD-10-CM

## 2023-02-06 DIAGNOSIS — R40.0 DAYTIME SOMNOLENCE: ICD-10-CM

## 2023-02-06 DIAGNOSIS — Z91.89 AT RISK FOR SLEEP APNEA: ICD-10-CM

## 2023-02-06 DIAGNOSIS — M43.6 NECK STIFFNESS: ICD-10-CM

## 2023-02-06 DIAGNOSIS — L71.9 ROSACEA: ICD-10-CM

## 2023-02-06 RX ORDER — PIMECROLIMUS 10 MG/G
CREAM TOPICAL DAILY
Qty: 30 G | Refills: 1 | Status: SHIPPED | OUTPATIENT
Start: 2023-02-06

## 2023-02-06 RX ORDER — AZITHROMYCIN 250 MG/1
TABLET, FILM COATED ORAL
Qty: 6 TABLET | Refills: 0 | Status: SHIPPED | OUTPATIENT
Start: 2023-02-06 | End: 2023-02-11

## 2023-02-06 RX ORDER — FAMOTIDINE 20 MG/1
20 TABLET, FILM COATED ORAL 2 TIMES DAILY
Qty: 60 TABLET | Refills: 0 | Status: SHIPPED | OUTPATIENT
Start: 2023-02-06 | End: 2023-03-08

## 2023-02-06 RX ORDER — LATANOPROST 50 UG/ML
SOLUTION/ DROPS OPHTHALMIC
COMMUNITY
Start: 2022-12-07

## 2023-02-06 RX ORDER — METHOCARBAMOL 500 MG/1
500 TABLET, FILM COATED ORAL 4 TIMES DAILY
Qty: 15 TABLET | Refills: 0 | Status: SHIPPED | OUTPATIENT
Start: 2023-02-06

## 2023-02-06 RX ORDER — FLUTICASONE PROPIONATE 50 MCG
1 SPRAY, SUSPENSION (ML) NASAL DAILY
Qty: 9.9 ML | Refills: 0 | Status: SHIPPED | OUTPATIENT
Start: 2023-02-06

## 2023-02-06 NOTE — PROGRESS NOTES
Name: Mahendra Rojas      : 1965      MRN: [id number]  Encounter Provider: Jacquie Cohen MD  Encounter Date: 2023   Encounter department: [address] [place] [address]     1  Pharyngitis, unspecified etiology  -     fluticasone (FLONASE) 50 mcg/act nasal spray; 1 spray into each nostril daily  -     azithromycin (Zithromax) 250 mg tablet; Take 2 tablets (500 mg total) by mouth daily for 1 day, THEN 1 tablet (250 mg total) daily for 4 days  -     CBC and differential; Future  -     Comprehensive metabolic panel; Future    2  Daytime somnolence  -     Ambulatory Referral to Sleep Medicine; Future    3  At risk for sleep apnea  -     Ambulatory Referral to Sleep Medicine; Future    4  Neck stiffness  -     methocarbamol (ROBAXIN) 500 mg tablet; Take 1 tablet (500 mg total) by mouth 4 (four) times a day    5  Urticaria  Assessment & Plan:  unknown etiology  Continue with Allegra and start pepcid 20mg bid  Will order Allergy panel  Should consider referral to allergist in the future  Orders:  -     famotidine (PEPCID) 20 mg tablet; Take 1 tablet (20 mg total) by mouth 2 (two) times a day  -     [facility] Allergy Panel, Adult; Future  -     CBC and differential; Future  -     Comprehensive metabolic panel; Future    6  Rosacea  -     pimecrolimus (ELIDEL) 1 % cream; Apply topically daily      BMI Counseling: Body mass index is 30 64 kg/m²  The BMI is above normal  Nutrition recommendations include decreasing portion sizes, consuming healthier snacks, reducing intake of saturated and trans fat and reducing intake of cholesterol  Exercise recommendations include moderate physical activity 150 minutes/week  No pharmacotherapy was ordered  Patient referred to PCP  Rationale for BMI follow-up plan is due to patient being overweight or obese  Depression Screening and Follow-up Plan: Patient was screened for depression during today's encounter   They screened negative with a PHQ-2 score of 1  Subjective      Patient presents with:  Sore Throat: Started on Tuesday  Headache  Urticaria: For 2 1/2 weeks that comes and goes    Worse in the am   Gets a little better but returns  Patient is also reporting on and off hives for the past 2 years  Unable to identify any triggers  Most recent episode on and off past 2 weeks hives reported over his legs all the way up to his face  Typically these are isolated to his torso  Only new change was nondairy creamer in his coffee 1 time  Denies any change in detergent, new pets  Was hiking in [place] and immediately developed hives on his face  Review of Systems   Constitutional: Negative for chills, fatigue and fever  HENT: Positive for sore throat  Respiratory: Negative for chest tightness and shortness of breath  Cardiovascular: Negative for chest pain and palpitations  Skin: Positive for pallor  Neurological: Positive for headaches  Current Outpatient Medications on File Prior to Visit   Medication Sig   • aspirin 81 MG tablet    • erythromycin (ILOTYCIN) ophthalmic ointment Administer 1 inch to both eyes daily at bedtime   • latanoprost (XALATAN) 0 005 % ophthalmic solution place 1 drop INTO AFFECTED EYE(S) every evening   • Omeprazole Magnesium (PRILOSEC OTC PO)    • tobramycin-dexamethasone (TOBRADEX) ophthalmic suspension Administer 1 drop to both eyes as needed (Patient not taking: Reported on 2/6/2023)   • [DISCONTINUED] pimecrolimus (ELIDEL) 1 % cream Apply topically daily (Patient not taking: Reported on 2/6/2023)       Objective     /78 (BP Location: Left arm, Patient Position: Sitting, Cuff Size: Large)   Pulse 90   Temp (!) 97 4 °F (36 3 °C)   Ht 5' 9" (1 753 m)   Wt 94 1 kg (207 lb 8 oz)   SpO2 99%   BMI 30 64 kg/m²     Physical Exam  Vitals and nursing note reviewed  Constitutional:       Appearance: He is well-developed  HENT:      Head: Normocephalic and atraumatic        Right Ear: Tympanic membrane normal       Left Ear: Tympanic membrane normal       Mouth/Throat:      Pharynx: Posterior oropharyngeal erythema present  No pharyngeal swelling or oropharyngeal exudate  Tonsils: No tonsillar exudate or tonsillar abscesses  0 on the right  0 on the left  Eyes:      Conjunctiva/sclera: Conjunctivae normal       Pupils: Pupils are equal, round, and reactive to light  Cardiovascular:      Rate and Rhythm: Normal rate and regular rhythm  Pulmonary:      Effort: Pulmonary effort is normal       Breath sounds: Normal breath sounds  Skin:     Findings: Rash present  Neurological:      General: No focal deficit present  Mental Status: He is alert     Psychiatric:         Mood and Affect: Mood normal          Behavior: Behavior normal        Cecilia Wong MD

## 2023-02-06 NOTE — ASSESSMENT & PLAN NOTE
unknown etiology  Continue with Allegra and start pepcid 20mg bid  Will order Allergy panel  Should consider referral to allergist in the future

## 2023-02-09 LAB
A ALTERNATA IGE QN: <0.1 KU/L
ALBUMIN SERPL-MCNC: 4.4 G/DL (ref 3.6–5.1)
ALBUMIN/GLOB SERPL: 1.6 (CALC) (ref 1–2.5)
ALP SERPL-CCNC: 112 U/L (ref 35–144)
ALT SERPL-CCNC: 44 U/L (ref 9–46)
AST SERPL-CCNC: 21 U/L (ref 10–35)
BASOPHILS # BLD AUTO: 58 CELLS/UL (ref 0–200)
BASOPHILS NFR BLD AUTO: 0.8 %
BILIRUB SERPL-MCNC: 1.7 MG/DL (ref 0.2–1.2)
BUN SERPL-MCNC: 19 MG/DL (ref 7–25)
BUN/CREAT SERPL: ABNORMAL (CALC) (ref 6–22)
C HERBARUM IGE QN: <0.1 KU/L
CALCIUM SERPL-MCNC: 9.4 MG/DL (ref 8.6–10.3)
CAT DANDER IGE QN: <0.1 KU/L
CHLORIDE SERPL-SCNC: 107 MMOL/L (ref 98–110)
CO2 SERPL-SCNC: 24 MMOL/L (ref 20–32)
COMMON RAGWEED IGE QN: <0.1 KU/L
CREAT SERPL-MCNC: 1.2 MG/DL (ref 0.7–1.3)
D FARINAE IGE QN: 0.11 KU/L
DEPRECATED A ALTERNATA IGE RAST QL: 0
DEPRECATED C HERBARUM IGE RAST QL: 0
DEPRECATED CAT DANDER IGE RAST QL: 0
DEPRECATED COMMON RAGWEED IGE RAST QL: 0
DEPRECATED D FARINAE IGE RAST QL: ABNORMAL
DEPRECATED DOG DANDER IGE RAST QL: 0
DEPRECATED GOOSEFOOT IGE RAST QL: ABNORMAL
DEPRECATED KENT BLUE GRASS IGE RAST QL: 0
DEPRECATED TIMOTHY IGE RAST QL: 0
DEPRECATED WHITE OAK IGE RAST QL: 0
DOG DANDER IGE QN: <0.1 KU/L
EOSINOPHIL # BLD AUTO: 409 CELLS/UL (ref 15–500)
EOSINOPHIL NFR BLD AUTO: 5.6 %
ERYTHROCYTE [DISTWIDTH] IN BLOOD BY AUTOMATED COUNT: 13.4 % (ref 11–15)
GFR/BSA.PRED SERPLBLD CYS-BASED-ARV: 70 ML/MIN/1.73M2
GLOBULIN SER CALC-MCNC: 2.7 G/DL (CALC) (ref 1.9–3.7)
GLUCOSE SERPL-MCNC: 93 MG/DL (ref 65–99)
GOOSEFOOT IGE QN: 0.15 KU/L
HCT VFR BLD AUTO: 44.3 % (ref 38.5–50)
HGB BLD-MCNC: 15.2 G/DL (ref 13.2–17.1)
KENT BLUE GRASS IGE QN: <0.1 KU/L
LYMPHOCYTES # BLD AUTO: 1599 CELLS/UL (ref 850–3900)
LYMPHOCYTES NFR BLD AUTO: 21.9 %
MCH RBC QN AUTO: 29.2 PG (ref 27–33)
MCHC RBC AUTO-ENTMCNC: 34.3 G/DL (ref 32–36)
MCV RBC AUTO: 85 FL (ref 80–100)
MONOCYTES # BLD AUTO: 672 CELLS/UL (ref 200–950)
MONOCYTES NFR BLD AUTO: 9.2 %
NEUTROPHILS # BLD AUTO: 4563 CELLS/UL (ref 1500–7800)
NEUTROPHILS NFR BLD AUTO: 62.5 %
PLATELET # BLD AUTO: 280 THOUSAND/UL (ref 140–400)
PMV BLD REES-ECKER: 10.6 FL (ref 7.5–12.5)
POTASSIUM SERPL-SCNC: 4.6 MMOL/L (ref 3.5–5.3)
PROT SERPL-MCNC: 7.1 G/DL (ref 6.1–8.1)
RBC # BLD AUTO: 5.21 MILLION/UL (ref 4.2–5.8)
SODIUM SERPL-SCNC: 139 MMOL/L (ref 135–146)
TIMOTHY IGE QN: <0.1 KU/L
WBC # BLD AUTO: 7.3 THOUSAND/UL (ref 3.8–10.8)
WHITE OAK IGE QN: <0.1 KU/L

## 2023-03-31 ENCOUNTER — OFFICE VISIT (OUTPATIENT)
Dept: FAMILY MEDICINE CLINIC | Facility: CLINIC | Age: 58
End: 2023-03-31

## 2023-03-31 VITALS
TEMPERATURE: 98.5 F | DIASTOLIC BLOOD PRESSURE: 88 MMHG | BODY MASS INDEX: 30.88 KG/M2 | WEIGHT: 208.5 LBS | SYSTOLIC BLOOD PRESSURE: 126 MMHG | OXYGEN SATURATION: 98 % | HEART RATE: 86 BPM | HEIGHT: 69 IN

## 2023-03-31 DIAGNOSIS — T69.1XXA PERNIO, INITIAL ENCOUNTER: Primary | ICD-10-CM

## 2023-03-31 DIAGNOSIS — Z12.12 SCREENING FOR COLORECTAL CANCER: ICD-10-CM

## 2023-03-31 DIAGNOSIS — Z12.11 SCREENING FOR COLORECTAL CANCER: ICD-10-CM

## 2023-03-31 RX ORDER — TRIAMCINOLONE ACETONIDE 5 MG/G
CREAM TOPICAL 2 TIMES DAILY
Qty: 30 G | Refills: 0 | Status: SHIPPED | OUTPATIENT
Start: 2023-03-31

## 2023-03-31 NOTE — PROGRESS NOTES
FAMILY MEDICINE PROGRESS NOTE    Date of Service: 23  Primary Care Provider:   Brina Jimenes MD       Name: Matt Tavares       : 1965       Age:58 y o  Sex: male      MRN: [id number]      Chief Complaint:Rash (Rash between toes of both feet)       ASSESSMENT and PLAN:  Matt Tavares is a 59 y o  male with:     Problem List Items Addressed This Visit    None  Visit Diagnoses     Pernio, initial encounter    -  Primary    Relevant Medications    triamcinolone (KENALOG) 0 5 % cream    Screening for colorectal cancer        Relevant Orders    Ambulatory referral for Colonoscopy          Rash appears to be consistent with pernio  Reviewed pathophysiology and advised patient to play closer attention to triggers of symptoms  Discussed treating with topical steroid cream for symptomatic relief though advised that true treatment is avoidance of triggers  SUBJECTIVE:  Matt Tavares is a 59 y o  male who presents today with a chief complaint of Rash (Rash between toes of both feet)  HPI     He reports rash between his toes, he just noticed this yesterday on both feet  He reports that he had similar rashes as a child, has history of eczema  He reports that the rash does get pruritic and is painful at times with walking  He has been try to keep his feet cool and dry  Review of Systems   Skin: Positive for rash  I have reviewed the patient's Past Medical History      Current Outpatient Medications:   •  aspirin 81 MG tablet, , Disp: , Rfl:   •  erythromycin (ILOTYCIN) ophthalmic ointment, Administer 1 inch to both eyes daily at bedtime, Disp: , Rfl:   •  famotidine (PEPCID) 20 mg tablet, Take 1 tablet (20 mg total) by mouth 2 (two) times a day, Disp: 60 tablet, Rfl: 0  •  fluticasone (FLONASE) 50 mcg/act nasal spray, 1 spray into each nostril daily, Disp: 9 9 mL, Rfl: 0  •  latanoprost (XALATAN) 0 005 % ophthalmic solution, place 1 drop INTO AFFECTED EYE(S) every evening, Disp: , "Rfl:   •  methocarbamol (ROBAXIN) 500 mg tablet, Take 1 tablet (500 mg total) by mouth 4 (four) times a day, Disp: 15 tablet, Rfl: 0  •  Omeprazole Magnesium (PRILOSEC OTC PO), , Disp: , Rfl:   •  pimecrolimus (ELIDEL) 1 % cream, Apply topically daily, Disp: 30 g, Rfl: 1  •  tobramycin-dexamethasone (TOBRADEX) ophthalmic suspension, Administer 1 drop to both eyes as needed, Disp: , Rfl:   •  triamcinolone (KENALOG) 0 5 % cream, Apply topically 2 (two) times a day, Disp: 30 g, Rfl: 0    OBJECTIVE:  /88 (BP Location: Left arm, Patient Position: Sitting, Cuff Size: Large)   Pulse 86   Temp 98 5 °F (36 9 °C)   Ht 5' 9\" (1 753 m)   Wt 94 6 kg (208 lb 8 oz)   SpO2 98%   BMI 30 79 kg/m²    BP Readings from Last 3 Encounters:   03/31/23 126/88   02/06/23 110/78   10/06/20 124/72      Wt Readings from Last 3 Encounters:   03/31/23 94 6 kg (208 lb 8 oz)   02/06/23 94 1 kg (207 lb 8 oz)   10/06/20 93 9 kg (207 lb)      Physical Exam  Constitutional:       General: He is not in acute distress  Appearance: Normal appearance  He is not ill-appearing or toxic-appearing  HENT:      Head: Normocephalic and atraumatic  Right Ear: External ear normal       Left Ear: External ear normal       Nose: Nose normal       Mouth/Throat:      Mouth: Mucous membranes are moist    Eyes:      Extraocular Movements: Extraocular movements intact  Conjunctiva/sclera: Conjunctivae normal    Pulmonary:      Effort: Pulmonary effort is normal  No respiratory distress  Musculoskeletal:      Cervical back: Normal range of motion and neck supple  No rigidity  Feet:      Right foot:      Skin integrity: Erythema present  No ulcer, blister, skin breakdown, warmth, callus or dry skin  Left foot:      Skin integrity: Erythema present  No ulcer, blister, skin breakdown, warmth, callus or dry skin  Skin:     Findings: Erythema and rash (Nonblanching, erythematous macules on toes) present  No ecchymosis or petechiae   " "  Neurological:      General: No focal deficit present  Mental Status: He is alert and oriented to person, place, and time  Psychiatric:         Mood and Affect: Mood normal          Behavior: Behavior normal                 Return if symptoms worsen or fail to improve  Earnest Norman MD    Note: Portions of the record have been created with voice recognition software  Occasional wrong word or \"sound a like\" substitutions may have occurred due to the inherent limitations of voice recognition software  Read the chart carefully and recognize, using context, where substitutions have occurred    "

## 2023-08-23 ENCOUNTER — OFFICE VISIT (OUTPATIENT)
Dept: FAMILY MEDICINE CLINIC | Facility: CLINIC | Age: 58
End: 2023-08-23
Payer: COMMERCIAL

## 2023-08-23 VITALS
TEMPERATURE: 97.3 F | WEIGHT: 207 LBS | DIASTOLIC BLOOD PRESSURE: 72 MMHG | SYSTOLIC BLOOD PRESSURE: 130 MMHG | OXYGEN SATURATION: 97 % | HEART RATE: 77 BPM | BODY MASS INDEX: 30.66 KG/M2 | HEIGHT: 69 IN

## 2023-08-23 DIAGNOSIS — G89.29 CHRONIC NECK PAIN: ICD-10-CM

## 2023-08-23 DIAGNOSIS — G44.86 CERVICOGENIC HEADACHE: Primary | ICD-10-CM

## 2023-08-23 DIAGNOSIS — M54.2 CHRONIC NECK PAIN: ICD-10-CM

## 2023-08-23 PROCEDURE — 99214 OFFICE O/P EST MOD 30 MIN: CPT | Performed by: FAMILY MEDICINE

## 2023-08-23 RX ORDER — METHYLPREDNISOLONE 4 MG/1
TABLET ORAL
Qty: 21 EACH | Refills: 0 | Status: SHIPPED | OUTPATIENT
Start: 2023-08-23

## 2023-08-23 RX ORDER — METHOCARBAMOL 500 MG/1
500 TABLET, FILM COATED ORAL 4 TIMES DAILY
Qty: 15 TABLET | Refills: 0 | Status: SHIPPED | OUTPATIENT
Start: 2023-08-23

## 2023-08-23 NOTE — PROGRESS NOTES
Name: Audrey Hatch      : 1965      MRN: [id number]  Encounter Provider: Gracy Schlatter, MD  Encounter Date: 2023   Encounter department: [address] [place] [address]     1. Cervicogenic headache  -     methylPREDNISolone 4 MG tablet therapy pack; Use as directed on package    2. Chronic neck pain  -     methocarbamol (ROBAXIN) 500 mg tablet; Take 1 tablet (500 mg total) by mouth 4 (four) times a day  -     XR spine cervical complete 4 or 5 vw non injury; Future; Expected date: 2023      Headaches consistent with musculoskeletal neck pain. Patient could be developing a sensitivity to alcohol states he is started developing these headaches after a few drinks. recommend trialing Medrol Dosepak and muscle relaxers. Patient's neurological exam within normal limits. No red flag symptoms at this time. No indication for CT of the head. Continue to monitor closely. Follow-up if symptoms do not improve. I have spent a total time of 30 minutes on 23 in caring for this patient including Prognosis, Risks and benefits of tx options, Instructions for management, Patient and family education, Risk factor reductions, Impressions, Documenting in the medical record, Reviewing / ordering tests, medicine, procedures   and Obtaining or reviewing history  . Subjective      Patient presents with:  Headache: Patient states the headaches started 3 weeks ago and had it for a severel days then went away and it comes and goes. Tender to touch back of neck. Feels the headache coming on. Tried motrin but really no improvement. Feels like his neck needs to pop. States if he has a beer within 1.5 hours he will have a severe headache. Denies any vision changes, N/V, no change in speech. Does endorse sharp headache waking from sleep. 1 year ago in [facility]       Review of Systems   Constitutional: Negative for fatigue and fever. HENT: Negative for sore throat. Eyes: Negative for photophobia, redness and visual disturbance. Respiratory: Negative for cough, chest tightness and shortness of breath. Cardiovascular: Negative for chest pain, palpitations and leg swelling. Gastrointestinal: Negative for abdominal pain, constipation, diarrhea and nausea. Endocrine: Negative for cold intolerance and heat intolerance. Genitourinary: Negative for flank pain. Musculoskeletal: Positive for neck pain. Negative for back pain. Skin: Negative for rash. Neurological: Positive for headaches. Negative for dizziness, tremors, syncope, facial asymmetry, speech difficulty, weakness and light-headedness. Psychiatric/Behavioral: Negative for behavioral problems, confusion, decreased concentration and dysphoric mood.        Current Outpatient Medications on File Prior to Visit   Medication Sig   • aspirin 81 MG tablet    • latanoprost (XALATAN) 0.005 % ophthalmic solution place 1 drop INTO AFFECTED EYE(S) every evening   • Omeprazole Magnesium (PRILOSEC OTC PO)    • pimecrolimus (ELIDEL) 1 % cream Apply topically daily   • erythromycin (ILOTYCIN) ophthalmic ointment Administer 1 inch to both eyes daily at bedtime (Patient not taking: Reported on 8/23/2023)   • famotidine (PEPCID) 20 mg tablet Take 1 tablet (20 mg total) by mouth 2 (two) times a day (Patient not taking: Reported on 8/23/2023)   • fluticasone (FLONASE) 50 mcg/act nasal spray 1 spray into each nostril daily   • tobramycin-dexamethasone (TOBRADEX) ophthalmic suspension Administer 1 drop to both eyes as needed (Patient not taking: Reported on 8/23/2023)   • triamcinolone (KENALOG) 0.5 % cream Apply topically 2 (two) times a day (Patient not taking: Reported on 8/23/2023)       Objective     /72 (BP Location: Left arm, Patient Position: Sitting, Cuff Size: Large)   Pulse 77   Temp (!) 97.3 °F (36.3 °C)   Ht 5' 9" (1.753 m)   Wt 93.9 kg (207 lb)   SpO2 97%   BMI 30.57 kg/m²     Physical Exam  Vitals and nursing note reviewed. Constitutional:       Appearance: He is well-developed. HENT:      Head: Normocephalic and atraumatic. Eyes:      General: No scleral icterus. Right eye: No discharge. Left eye: No discharge. Extraocular Movements: Extraocular movements intact. Pupils: Pupils are equal, round, and reactive to light. Cardiovascular:      Rate and Rhythm: Normal rate and regular rhythm. Pulmonary:      Effort: Pulmonary effort is normal.      Breath sounds: Normal breath sounds. Musculoskeletal:         General: Tenderness present. Skin:     General: Skin is warm and dry. Neurological:      General: No focal deficit present. Mental Status: He is alert and oriented to person, place, and time. Cranial Nerves: No cranial nerve deficit. Sensory: No sensory deficit. Motor: No weakness.       Coordination: Coordination normal.      Gait: Gait normal.      Deep Tendon Reflexes: Reflexes normal.   Psychiatric:         Mood and Affect: Mood normal.         Behavior: Behavior normal.       Rigo Pedroza MD

## 2024-05-09 ENCOUNTER — APPOINTMENT (EMERGENCY)
Dept: GASTROENTEROLOGY | Facility: HOSPITAL | Age: 59
End: 2024-05-09
Payer: COMMERCIAL

## 2024-05-09 ENCOUNTER — APPOINTMENT (EMERGENCY)
Dept: RADIOLOGY | Facility: HOSPITAL | Age: 59
End: 2024-05-09
Payer: COMMERCIAL

## 2024-05-09 ENCOUNTER — NURSE TRIAGE (OUTPATIENT)
Age: 59
End: 2024-05-09

## 2024-05-09 ENCOUNTER — ANESTHESIA EVENT (EMERGENCY)
Dept: GASTROENTEROLOGY | Facility: HOSPITAL | Age: 59
End: 2024-05-09
Payer: COMMERCIAL

## 2024-05-09 ENCOUNTER — HOSPITAL ENCOUNTER (EMERGENCY)
Facility: HOSPITAL | Age: 59
Discharge: HOME | End: 2024-05-09
Attending: EMERGENCY MEDICINE
Payer: COMMERCIAL

## 2024-05-09 ENCOUNTER — ANESTHESIA (EMERGENCY)
Dept: GASTROENTEROLOGY | Facility: HOSPITAL | Age: 59
End: 2024-05-09
Payer: COMMERCIAL

## 2024-05-09 VITALS
SYSTOLIC BLOOD PRESSURE: 145 MMHG | OXYGEN SATURATION: 95 % | DIASTOLIC BLOOD PRESSURE: 93 MMHG | WEIGHT: 204.15 LBS | BODY MASS INDEX: 30.15 KG/M2 | HEART RATE: 87 BPM | TEMPERATURE: 97.6 F | RESPIRATION RATE: 18 BRPM
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
## 2024-05-09 DIAGNOSIS — T18.128A FOOD IMPACTION OF ESOPHAGUS, INITIAL ENCOUNTER: Primary | ICD-10-CM

## 2024-05-09 PROBLEM — R13.19 OTHER DYSPHAGIA: Status: ACTIVE | Noted: 2024-05-09

## 2024-05-09 PROCEDURE — 43247 EGD REMOVE FOREIGN BODY: CPT | Performed by: INTERNAL MEDICINE

## 2024-05-09 PROCEDURE — 99244 OFF/OP CNSLTJ NEW/EST MOD 40: CPT | Performed by: INTERNAL MEDICINE

## 2024-05-09 PROCEDURE — 96375 TX/PRO/DX INJ NEW DRUG ADDON: CPT

## 2024-05-09 PROCEDURE — 96374 THER/PROPH/DIAG INJ IV PUSH: CPT

## 2024-05-09 PROCEDURE — 88305 TISSUE EXAM BY PATHOLOGIST: CPT | Performed by: PATHOLOGY

## 2024-05-09 PROCEDURE — 96361 HYDRATE IV INFUSION ADD-ON: CPT

## 2024-05-09 PROCEDURE — 99284 EMERGENCY DEPT VISIT MOD MDM: CPT | Performed by: EMERGENCY MEDICINE

## 2024-05-09 PROCEDURE — 43239 EGD BIOPSY SINGLE/MULTIPLE: CPT | Performed by: INTERNAL MEDICINE

## 2024-05-09 PROCEDURE — 71045 X-RAY EXAM CHEST 1 VIEW: CPT

## 2024-05-09 PROCEDURE — 99284 EMERGENCY DEPT VISIT MOD MDM: CPT

## 2024-05-09 PROCEDURE — C9113 INJ PANTOPRAZOLE SODIUM, VIA: HCPCS | Performed by: EMERGENCY MEDICINE

## 2024-05-09 RX ORDER — DEXAMETHASONE SODIUM PHOSPHATE 10 MG/ML
INJECTION, SOLUTION INTRAMUSCULAR; INTRAVENOUS AS NEEDED
Status: DISCONTINUED | OUTPATIENT
Start: 2024-05-09 | End: 2024-05-09

## 2024-05-09 RX ORDER — FAMOTIDINE 10 MG/ML
20 INJECTION, SOLUTION INTRAVENOUS ONCE
Status: COMPLETED | OUTPATIENT
Start: 2024-05-09 | End: 2024-05-09

## 2024-05-09 RX ORDER — PROPOFOL 10 MG/ML
INJECTION, EMULSION INTRAVENOUS AS NEEDED
Status: DISCONTINUED | OUTPATIENT
Start: 2024-05-09 | End: 2024-05-09

## 2024-05-09 RX ORDER — ONDANSETRON 2 MG/ML
4 INJECTION INTRAMUSCULAR; INTRAVENOUS ONCE
Status: COMPLETED | OUTPATIENT
Start: 2024-05-09 | End: 2024-05-09

## 2024-05-09 RX ORDER — SODIUM CHLORIDE 9 MG/ML
INJECTION, SOLUTION INTRAVENOUS CONTINUOUS PRN
Status: DISCONTINUED | OUTPATIENT
Start: 2024-05-09 | End: 2024-05-09

## 2024-05-09 RX ORDER — NITROGLYCERIN 0.4 MG/1
0.4 TABLET SUBLINGUAL ONCE
Status: COMPLETED | OUTPATIENT
Start: 2024-05-09 | End: 2024-05-09

## 2024-05-09 RX ORDER — LIDOCAINE HYDROCHLORIDE 10 MG/ML
INJECTION, SOLUTION EPIDURAL; INFILTRATION; INTRACAUDAL; PERINEURAL AS NEEDED
Status: DISCONTINUED | OUTPATIENT
Start: 2024-05-09 | End: 2024-05-09

## 2024-05-09 RX ORDER — GLYCOPYRROLATE 0.2 MG/ML
INJECTION INTRAMUSCULAR; INTRAVENOUS AS NEEDED
Status: DISCONTINUED | OUTPATIENT
Start: 2024-05-09 | End: 2024-05-09

## 2024-05-09 RX ORDER — SUCCINYLCHOLINE/SOD CL,ISO/PF 100 MG/5ML
SYRINGE (ML) INTRAVENOUS AS NEEDED
Status: DISCONTINUED | OUTPATIENT
Start: 2024-05-09 | End: 2024-05-09

## 2024-05-09 RX ORDER — PANTOPRAZOLE SODIUM 40 MG/10ML
40 INJECTION, POWDER, LYOPHILIZED, FOR SOLUTION INTRAVENOUS ONCE
Status: COMPLETED | OUTPATIENT
Start: 2024-05-09 | End: 2024-05-09

## 2024-05-09 RX ORDER — ONDANSETRON 2 MG/ML
INJECTION INTRAMUSCULAR; INTRAVENOUS AS NEEDED
Status: DISCONTINUED | OUTPATIENT
Start: 2024-05-09 | End: 2024-05-09

## 2024-05-09 RX ORDER — FENTANYL CITRATE/PF 50 MCG/ML
50 SYRINGE (ML) INJECTION
Status: DISCONTINUED | OUTPATIENT
Start: 2024-05-09 | End: 2024-05-09 | Stop reason: HOSPADM

## 2024-05-09 RX ORDER — FENTANYL CITRATE 50 UG/ML
INJECTION, SOLUTION INTRAMUSCULAR; INTRAVENOUS AS NEEDED
Status: DISCONTINUED | OUTPATIENT
Start: 2024-05-09 | End: 2024-05-09

## 2024-05-09 RX ADMIN — ONDANSETRON 4 MG: 2 INJECTION INTRAMUSCULAR; INTRAVENOUS at 15:44

## 2024-05-09 RX ADMIN — NITROGLYCERIN 0.4 MG: 0.4 TABLET SUBLINGUAL at 09:40

## 2024-05-09 RX ADMIN — SODIUM CHLORIDE: 0.9 INJECTION, SOLUTION INTRAVENOUS at 15:41

## 2024-05-09 RX ADMIN — Medication 100 MG: at 15:40

## 2024-05-09 RX ADMIN — DEXAMETHASONE SODIUM PHOSPHATE 10 MG: 10 INJECTION, SOLUTION INTRAMUSCULAR; INTRAVENOUS at 15:44

## 2024-05-09 RX ADMIN — GLUCAGON 1 MG: KIT at 11:20

## 2024-05-09 RX ADMIN — FAMOTIDINE 20 MG: 10 INJECTION INTRAVENOUS at 10:14

## 2024-05-09 RX ADMIN — FENTANYL CITRATE 100 MCG: 50 INJECTION INTRAMUSCULAR; INTRAVENOUS at 15:40

## 2024-05-09 RX ADMIN — LIDOCAINE HYDROCHLORIDE 50 MG: 10 INJECTION, SOLUTION EPIDURAL; INFILTRATION; INTRACAUDAL; PERINEURAL at 15:40

## 2024-05-09 RX ADMIN — PANTOPRAZOLE SODIUM 40 MG: 40 INJECTION, POWDER, FOR SOLUTION INTRAVENOUS at 10:14

## 2024-05-09 RX ADMIN — NITROGLYCERIN 0.4 MG: 0.4 TABLET SUBLINGUAL at 10:22

## 2024-05-09 RX ADMIN — PROPOFOL 200 MG: 10 INJECTION, EMULSION INTRAVENOUS at 15:40

## 2024-05-09 RX ADMIN — LIDOCAINE HYDROCHLORIDE 100 MG: 10 INJECTION, SOLUTION EPIDURAL; INFILTRATION; INTRACAUDAL; PERINEURAL at 15:58

## 2024-05-09 RX ADMIN — SODIUM CHLORIDE 1000 ML: 0.9 INJECTION, SOLUTION INTRAVENOUS at 10:13

## 2024-05-09 RX ADMIN — ONDANSETRON 4 MG: 2 INJECTION INTRAMUSCULAR; INTRAVENOUS at 09:23

## 2024-05-09 RX ADMIN — GLYCOPYRROLATE 0.2 MG: 0.2 INJECTION INTRAMUSCULAR; INTRAVENOUS at 15:44

## 2024-05-09 NOTE — PERIOPERATIVE NURSING NOTE
Patient is tolerating liquids with no complaints of pain/swallowing issues. No nausea. VSS. Dr. Aguilar spoke with pt. At bedside post procedure. Pt. Discharged.

## 2024-05-09 NOTE — CONSULTS
Consultation -  [facility]  Paul Jennings 59 y.o. male MRN: [id number]  Unit/Bed#: ED-19 Encounter: [id number]        Inpatient consult to gastroenterology  Consult performed by: Verito White PA-C  Consult ordered by: Vincent Purvis DO          Reason for Consult / Principal Problem: esophageal food impaction    ASSESSMENT and PLAN:      59-year-old male with longstanding history of dysphagia symptoms for over 20 years without prior workup who presented this morning after inability to tolerate secretions after eating steak last night around 6 PM.    Suspected esophageal food impaction  Chronic dysphagia and GERD  Symptoms began around 6 PM last night after eating steak.  No improvement with glucagon or nitro.  Unable to tolerate saliva at this time.  Reports intermittent episodes for over 20 years, seem to have worsened lately.  On PPI daily.  No unintentional weight loss.  Possibly underlying Schatzki's ring, EOE, appears less likely lesion or malignancy.    -Plan for EGD today.  Discussed with patient that general anesthesia with intubation will be needed for airway protection.  Discussed risks of bleeding and perforation.  -Keep n.p.o.  - Discussed with patient that he will likely require repeat EGD in around 2 to 3 months to further evaluate for underlying etiology or dilate any possible stricture.  - Recommend he avoid all meat products and bread until repeat endoscopy in a few months.  - Discussed likely need for increasing PPI treatment based on findings of EGD.  - Discussed with patient's spouse at bedside.  - Discussed with ER attending.    -------------------------------------------------------------------------------------------------------------------    HPI:     Paul Jennings is a 59-year-old male with no significant past medical history who presented to the emergency room today due to inability to tolerate secretions after eating steak last night.  Patient reports  eating steak around 6 PM.  He immediately felt get stuck in his chest.  He tried sips of water however it did not resolve.  He tried sips of water again this morning with no improvement prompting him to come to the ER.  He received nitro and glucagon without passage.  He is not tolerating secretions at this time.    Patient reports a longstanding history of intermittent dysphagia for over 20 years.  He has never seen GI for this.  He never had an EGD.  He has chronic reflux and takes over-the-counter Prilosec 20 mg daily.  He reports episodes typically happen with meat and starch products.  Recently episodes have seemed to last longer.  Previously would resolve with water or regurgitation.  Denies any unintentional weight loss.  No blood thinners.    Denies smoking history.  Reports occasional on and off alcohol use.    Abdominal surgeries consistent for cholecystectomy and hernia repair.    Last colonoscopy around 20 years ago.  No prior EGD.    REVIEW OF SYSTEMS:    CONSTITUTIONAL: Denies any fever, chills, or rigors. Good appetite, and no recent weight loss.  HEENT: No earache or tinnitus. Denies hearing loss or visual disturbances.  CARDIOVASCULAR: No chest pain or palpitations.   RESPIRATORY: Denies any cough, hemoptysis, shortness of breath or dyspnea on exertion.  GASTROINTESTINAL: As noted in the History of Present Illness.   GENITOURINARY: No problems with urination. Denies any hematuria or dysuria.  NEUROLOGIC: No dizziness or vertigo, denies headaches.   MUSCULOSKELETAL: Denies any muscle or joint pain.   SKIN: Denies skin rashes or itching.   ENDOCRINE: Denies excessive thirst. Denies intolerance to heat or cold.  PSYCHOSOCIAL: Denies depression or anxiety. Denies any recent memory loss.       Historical Information   History reviewed. No pertinent past medical history.  History reviewed. No pertinent surgical history.  Social History   Social History     Substance and Sexual Activity   Alcohol Use Yes  "    Social History     Substance and Sexual Activity   Drug Use Never     Social History     Tobacco Use   Smoking Status Never   Smokeless Tobacco Never     Family History   Problem Relation Age of Onset    Osteoarthritis Mother     Colon polyps Mother     Glaucoma Father     Heart attack Father     Skin cancer Father     Breast cancer Sister         stage 4       Meds/Allergies     (Not in a hospital admission)    No current facility-administered medications for this encounter.       Allergies   Allergen Reactions    Iv Dye [Iodinated Contrast Media]      hives           Objective     Blood pressure 143/94, pulse 81, temperature 98 °F (36.7 °C), temperature source Oral, resp. rate 18, weight 92.6 kg (204 lb 2.3 oz), SpO2 98%.      Intake/Output Summary (Last 24 hours) at 5/9/2024 1218  Last data filed at 5/9/2024 1122  Gross per 24 hour   Intake 1000 ml   Output --   Net 1000 ml         PHYSICAL EXAM:      General Appearance:   Alert and cooperative.  Spitting out saliva throughout encounter.   HEENT:   Normocephalic, atraumatic, anicteric.   Neck:  Supple, symmetrical, trachea midline, no adenopathy.   Lungs:   Clear to auscultation bilaterally; no rales, rhonchi or wheezing; respirations unlabored    Heart::   S1 and S2 normal; regular rate and rhythm; no murmur, rub, or gallop.   Abdomen:   Soft, non-tender, non-distended; normal bowel sounds; no masses, no organomegaly    Genitalia:   Deferred    Rectal:   Deferred    Extremities:  No cyanosis, clubbing or edema    Pulses:  2+ and symmetric all extremities    Skin:  Skin color, texture, turgor normal, no rashes or lesions    Lymph nodes:  No palpable cervical, axillary or inguinal lymphadenopathy        Lab Results:             Invalid input(s): \"LABALBU\"            Imaging Studies: I have personally reviewed pertinent imaging studies.    No results found.        Patient was seen and examined by Dr. Aguilar. All peoples medical decisions were made by Dr. Aguilar. " Thank you for allowing us to participate in the care of this present patient. We will follow-up with you closely.

## 2024-05-09 NOTE — ED PROVIDER NOTES
History  Chief Complaint   Patient presents with    Difficulty Swallowing     States has had multiple food boluses in the past that have cleared on their own after drinking water. States had the sensation that something got stuck last night at dinner but was not able to clear it this time. No difficulty managing secretions at time of triage.      HPI    59-year-old male, past medical history below, presenting to the emergency department with inability to tolerate p.o.  Patient notes steak dinner last evening approximately 16 hours ago and since has been unable to tolerate p.o. with spit up thereafter.  Patient notes sensation in the middle of the chest similar to previous episodes where and he felt as though food was stuck but they cleared by themselves.  This case however has not cleared.  Patient without any other symptoms.  Asymptomatic prior to eating steak.    Prior to Admission Medications   Prescriptions Last Dose Informant Patient Reported? Taking?   Omeprazole Magnesium (PRILOSEC OTC PO) 2024 Self Yes Yes   aspirin 81 MG tablet 2024 Self Yes Yes   erythromycin (ILOTYCIN) ophthalmic ointment  Self Yes No   Sig: Administer 1 inch to both eyes daily at bedtime   Patient not taking: Reported on 2023   famotidine (PEPCID) 20 mg tablet   No No   Sig: Take 1 tablet (20 mg total) by mouth 2 (two) times a day   Patient not taking: Reported on 2023   fluticasone (FLONASE) 50 mcg/act nasal spray   No No   Si spray into each nostril daily   latanoprost (XALATAN) 0.005 % ophthalmic solution   Yes No   Sig: place 1 drop INTO AFFECTED EYE(S) every evening   methocarbamol (ROBAXIN) 500 mg tablet 2024  No Yes   Sig: Take 1 tablet (500 mg total) by mouth 4 (four) times a day   pimecrolimus (ELIDEL) 1 % cream Past Month  No Yes   Sig: Apply topically daily   tobramycin-dexamethasone (TOBRADEX) ophthalmic suspension  Self Yes No   Sig: Administer 1 drop to both eyes as needed   Patient not taking:  Reported on 8/23/2023   triamcinolone (KENALOG) 0.5 % cream   No No   Sig: Apply topically 2 (two) times a day      Facility-Administered Medications: None       History reviewed. No pertinent past medical history.    Past Surgical History:   Procedure Laterality Date    CHOLECYSTECTOMY         Family History   Problem Relation Age of Onset    Osteoarthritis Mother     Colon polyps Mother     Glaucoma Father     Heart attack Father     Skin cancer Father     Breast cancer Sister         stage 4     I have reviewed and agree with the history as documented.    E-Cigarette/Vaping    E-Cigarette Use Never User      E-Cigarette/Vaping Substances     Social History     Tobacco Use    Smoking status: Never    Smokeless tobacco: Never   Vaping Use    Vaping status: Never Used   Substance Use Topics    Alcohol use: Yes    Drug use: Never       Review of Systems   Constitutional:  Negative for chills and fever.   HENT:  Negative for ear pain and sore throat.    Eyes:  Negative for pain and visual disturbance.   Respiratory:  Positive for chest tightness. Negative for cough and shortness of breath.    Cardiovascular:  Negative for chest pain and palpitations.   Gastrointestinal:  Negative for abdominal pain and vomiting.   Genitourinary:  Negative for dysuria and hematuria.   Musculoskeletal:  Negative for arthralgias and back pain.   Skin:  Negative for color change and rash.   Neurological:  Negative for seizures and syncope.   All other systems reviewed and are negative.      Physical Exam  Physical Exam  Vitals and nursing note reviewed.   Constitutional:       General: He is not in acute distress.     Appearance: He is well-developed.   HENT:      Head: Normocephalic and atraumatic.   Eyes:      Conjunctiva/sclera: Conjunctivae normal.   Cardiovascular:      Rate and Rhythm: Normal rate and regular rhythm.      Heart sounds: No murmur heard.  Pulmonary:      Effort: Pulmonary effort is normal. No respiratory distress.       Breath sounds: Normal breath sounds.   Abdominal:      Palpations: Abdomen is soft.      Tenderness: There is no abdominal tenderness.   Musculoskeletal:         General: No swelling.      Cervical back: Neck supple.   Skin:     General: Skin is warm and dry.      Capillary Refill: Capillary refill takes less than 2 seconds.   Neurological:      Mental Status: He is alert.   Psychiatric:         Mood and Affect: Mood normal.         Vital Signs  ED Triage Vitals [05/09/24 0904]   Temperature Pulse Respirations Blood Pressure SpO2   98 °F (36.7 °C) 79 18 (!) 154/105 97 %      Temp Source Heart Rate Source Patient Position - Orthostatic VS BP Location FiO2 (%)   Oral Monitor Sitting Right arm --      Pain Score       --           Vitals:    05/09/24 0926 05/09/24 0945 05/09/24 1130 05/09/24 1334   BP: (!) 153/107 142/98 143/94 158/98   Pulse:  101 81 73   Patient Position - Orthostatic VS:             Visual Acuity      ED Medications  Medications   nitroglycerin (NITROSTAT) SL tablet 0.4 mg (0.4 mg Sublingual Given 5/9/24 0940)   ondansetron (ZOFRAN) injection 4 mg (4 mg Intravenous Given 5/9/24 0923)   sodium chloride 0.9 % bolus 1,000 mL (0 mL Intravenous Stopped 5/9/24 1122)   Famotidine (PF) (PEPCID) injection 20 mg (20 mg Intravenous Given 5/9/24 1014)   pantoprazole (PROTONIX) injection 40 mg (40 mg Intravenous Given 5/9/24 1014)   nitroglycerin (NITROSTAT) SL tablet 0.4 mg (0.4 mg Sublingual Given 5/9/24 1022)   glucagon (GLUCAGEN) injection 1 mg (1 mg Intravenous Given 5/9/24 1120)       Diagnostic Studies  Results Reviewed       None                   XR chest 1 view portable   ED Interpretation by Vincent Purvis DO (05/09 1052)   NAD                 Procedures  Procedures         ED Course  ED Course as of 05/09/24 1539   Thu May 09, 2024   1050 Pt with failure to move bolus x2 with NTG in 10ml to relax smooth muscle. Requested avoidance of glucagon with GI given data in combination with pt tolerance.  They're requesting it be ordered prior to attempt at lab retrieval. Will order now at GI request.    1140 No change in clinical status s/p glucagon. Pt to require endoscopy.                                              Medical Decision Making  59-year-old male presenting to the emergency department with food impaction.  Attempted nitroglycerin and 10 mL fluids x 2 in order to relax associated smooth muscle.  Although application is undergoing further investigation, it is well-tolerated and given that glucagon is not supported, was attempted.  GI insisted glucagon attempt be made.  Glucagon ordered without effect.  Chest x-ray without acute pathology.  Patient accepted to GI lab for removal of impaction.    Problems Addressed:  Food impaction of esophagus, initial encounter: acute illness or injury    Amount and/or Complexity of Data Reviewed  Independent Historian: spouse  External Data Reviewed: notes.  Radiology: ordered and independent interpretation performed.    Risk  Prescription drug management.  Decision regarding hospitalization.             Disposition  Final diagnoses:   Food impaction of esophagus, initial encounter     Time reflects when diagnosis was documented in both MDM as applicable and the Disposition within this note       Time User Action Codes Description Comment    5/9/2024 10:20 AM Vincent Purvis Add [T18.128A,  W44.F3XA] Food impaction of esophagus, initial encounter           ED Disposition       ED Disposition   Send to OR    Condition   --    Date/Time   Thu May 9, 2024 11:42 AM    Comment   --             Follow-up Information       Follow up With Specialties Details Why Contact Info Additional Information    [facility] [address] [place] [address] Gastroenterology Call  For review of findings and symptoms. [address] [place] [id number] [phone] [facility] [place] [facility] [address] [place],  [address], [phone]             Current Discharge Medication List        CONTINUE these medications which have NOT CHANGED    Details   aspirin 81 MG tablet       methocarbamol (ROBAXIN) 500 mg tablet Take 1 tablet (500 mg total) by mouth 4 (four) times a day  Qty: 15 tablet, Refills: 0    Associated Diagnoses: Chronic neck pain      Omeprazole Magnesium (PRILOSEC OTC PO)       pimecrolimus (ELIDEL) 1 % cream Apply topically daily  Qty: 30 g, Refills: 1    Associated Diagnoses: Rosacea      erythromycin (ILOTYCIN) ophthalmic ointment Administer 1 inch to both eyes daily at bedtime      famotidine (PEPCID) 20 mg tablet Take 1 tablet (20 mg total) by mouth 2 (two) times a day  Qty: 60 tablet, Refills: 0    Associated Diagnoses: Urticaria      fluticasone (FLONASE) 50 mcg/act nasal spray 1 spray into each nostril daily  Qty: 9.9 mL, Refills: 0    Associated Diagnoses: Pharyngitis, unspecified etiology      latanoprost (XALATAN) 0.005 % ophthalmic solution place 1 drop INTO AFFECTED EYE(S) every evening      tobramycin-dexamethasone (TOBRADEX) ophthalmic suspension Administer 1 drop to both eyes as needed      triamcinolone (KENALOG) 0.5 % cream Apply topically 2 (two) times a day  Qty: 30 g, Refills: 0    Associated Diagnoses: Pernio, initial encounter                 PDMP Review       None            ED Provider  Electronically Signed by             Vincent Purvis DO  05/09/24 3345

## 2024-05-09 NOTE — TELEPHONE ENCOUNTER
"Pt calling with sudden onset of swallowing difficulty last night at 1830 while he was eating dinner.  This morning he cannot swallow solids or liquids.  He cannot manage his own secretions.  He has had a few previous episodes of this that have resolved spontaneously.  Pt denies SOB.  He will go to [place] ED to be evaluated now.     Reason for Disposition   SEVERE difficulty swallowing (e.g., drooling or spitting, can't swallow water)    Answer Assessment - Initial Assessment Questions  1. SYMPTOM: \"Are you having difficulty swallowing liquids, solids, or both?\"      both  2. ONSET: \"When did the swallowing problems begin?\"       Last night around 1830/1900  3. CAUSE: \"What do you think is causing the problem?\"       I'm not sure   4. CHRONIC/RECURRENT: \"Is this a new problem for you?\"  If no, ask: \"How long have you had this problem?\" (e.g., days, weeks, months)       Since last night   5. OTHER SYMPTOMS: \"Do you have any other symptoms?\" (e.g., difficulty breathing, sore throat, swollen tongue, chest pain)      no  6. PREGNANCY: \"Is there any chance you are pregnant?\" \"When was your last menstrual period?\"      no    Protocols used: Swallowing Difficulty-ADULT-[place]    "

## 2024-05-09 NOTE — ANESTHESIA PREPROCEDURE EVALUATION
Procedure:  EGD    Relevant Problems   CARDIO   (+) Coronary artery disease involving native coronary artery of native heart without angina pectoris   (+) Hypercholesterolemia      GI/HEPATIC   (+) Esophageal reflux        Physical Exam    Airway    Mallampati score: II  TM Distance: >3 FB  Neck ROM: full     Dental   No notable dental hx     Cardiovascular      Pulmonary      Other Findings        Anesthesia Plan  ASA Score- 2     Anesthesia Type- IV sedation with anesthesia with ASA Monitors.         Additional Monitors:     Airway Plan:     Comment: I have seen the patient and reviewed the history.  Patient to receive IV sedation with full ASA monitors.  Risks discussed with the patient, consent signed.  .       Plan Factors-Exercise tolerance (METS): >4 METS.    Chart reviewed.    Patient summary reviewed.                  Induction- intravenous.    Postoperative Plan-     Informed Consent- Anesthetic plan and risks discussed with patient.  I personally reviewed this patient with the CRNA. Discussed and agreed on the Anesthesia Plan with the CRNA..

## 2024-05-10 DIAGNOSIS — K21.9 GASTROESOPHAGEAL REFLUX DISEASE, UNSPECIFIED WHETHER ESOPHAGITIS PRESENT: Primary | ICD-10-CM

## 2024-05-10 RX ORDER — PANTOPRAZOLE SODIUM 40 MG/1
40 TABLET, DELAYED RELEASE ORAL
Qty: 60 TABLET | Refills: 0 | Status: SHIPPED | OUTPATIENT
Start: 2024-05-10 | End: 2024-06-09

## 2024-05-10 NOTE — TELEPHONE ENCOUNTER
Patients GI provider:  Dr. Aguilar    Number to return call: [phone]    Reason for call: Pt calling stating had a EGD done 5/9/2024 by  in the ER  Being told a script would be called in to his pharmacy for Pantoprazole.  Patient stated he called the pharmacy but there was nothing called in he was told. Please advise / Patient will await a call back once script is placed.  Script should have been called into [facility] in [place] ( which is on file / verfied and correct)    Scheduled procedure/appointment date if applicable:

## 2024-05-16 ENCOUNTER — TELEPHONE (OUTPATIENT)
Dept: GASTROENTEROLOGY | Facility: AMBULARY SURGERY CENTER | Age: 59
End: 2024-05-16

## 2024-05-20 NOTE — ANESTHESIA POSTPROCEDURE EVALUATION
Post-Op Assessment Note                Reason for prolonged intubation > 24 hours:  NaReason for prolonged intubation > 48 hours:  Na          BP      Temp      Pulse     Resp      SpO2

## 2024-05-30 DIAGNOSIS — K21.9 GASTROESOPHAGEAL REFLUX DISEASE, UNSPECIFIED WHETHER ESOPHAGITIS PRESENT: ICD-10-CM

## 2024-05-31 RX ORDER — PANTOPRAZOLE SODIUM 40 MG/1
40 TABLET, DELAYED RELEASE ORAL
Qty: 60 TABLET | Refills: 0 | Status: SHIPPED | OUTPATIENT
Start: 2024-05-31

## 2024-06-03 ENCOUNTER — TELEPHONE (OUTPATIENT)
Age: 59
End: 2024-06-03

## 2024-06-03 DIAGNOSIS — K21.9 GASTROESOPHAGEAL REFLUX DISEASE, UNSPECIFIED WHETHER ESOPHAGITIS PRESENT: ICD-10-CM

## 2024-06-03 NOTE — TELEPHONE ENCOUNTER
Patient called to request a refill for the Protonix 40 mg. Advised a refill was sent to the [facility] in [place] on 5/31/2024.

## 2024-06-03 NOTE — TELEPHONE ENCOUNTER
OA Questions for EGD    Screened by: Guy Ray MA    Referring Provider melia    Pre- Screening:     There is no height or weight on file to calculate BMI.    Previous EGD. yes   If yes:    Date: 5/2024    Facility:     Reason:       Does the patient want to see a Gastroenterologist prior to their procedure OR are they having any GI symptoms? no    Has the patient been hospitalized or had abdominal surgery in the past 6 months? no    Does the patient use supplemental oxygen? no    Does the patient take Coumadin, Lovenox, Plavix, Elliquis, Xarelto, or other blood thinning medication? no    Has the patient had a stroke, cardiac event, or stent placed in the past year? no    EGD scheduled    If patient is between 45yrs - 49yrs, please advise patient that we will have to confirm benefits & coverage with their insurance company for a routine screening colonoscopy.      ASC Screening    ASC Screening  BMI > than 45: No  Are you currently pregnant?: No  Do you rely on a wheelchair for mobility?: No  Do you need oxygen during the day?: No  Have you ever been informed by anesthesia that you have a difficult airway?: No  Have you been diagnosed with End Stage Renal Disease (ESRD)?: No  Are you actively on dialysis?: No  Have you been diagnosed with Pulmonary Hypertension?: No  Do you have a pacemaker or an Automatic Implantable Cardioverter Defibrillator (AICD)?: No  Have you ever had an organ transplant?: No  Have you had a stroke, heart attack, myocardial infarction (MI) within the last 6 months?: No  Have you ever been diagnosed with Aortic Stenosis?: No  Have you ever been diagnosed  with Congestive Heart Failure?: No  Have you ever been diagnosed with a heart valve disease?: No  Are you Diabetic?: No  If you are Diabetic, has your A1C been greater than 12 within the last six months?: N/A         Juan R Jennings MRN: [id number]    Date: 8/27/2024 Status: Kira   Time: 1:30 PM  1:30 PM Length: 30  30   Visit Type: GI  EGD [1105] Copay: $0.00   Provider: Drea Aguilar MD  [facility] GI ROOM 02       Instructions reviewed with patient by:mahamed sevilla  Clearances: none

## 2024-06-25 DIAGNOSIS — K21.9 GASTROESOPHAGEAL REFLUX DISEASE, UNSPECIFIED WHETHER ESOPHAGITIS PRESENT: ICD-10-CM

## 2024-06-26 RX ORDER — PANTOPRAZOLE SODIUM 40 MG/1
40 TABLET, DELAYED RELEASE ORAL
Qty: 60 TABLET | Refills: 5 | Status: SHIPPED | OUTPATIENT
Start: 2024-06-26

## 2024-07-30 ENCOUNTER — TELEPHONE (OUTPATIENT)
Age: 59
End: 2024-07-30

## 2024-07-30 NOTE — TELEPHONE ENCOUNTER
Rescheduled    Scheduled date of colonoscopy (as of today): 09-  Physician performing colonoscopy: Dr. Aguilar  Location of colonoscopy: [facility]   Bowel prep reviewed with patient: reviewed 6-3-2024  Instructions reviewed with patient by: spring  Clearances: N/A

## 2024-09-11 ENCOUNTER — ANESTHESIA EVENT (OUTPATIENT)
Dept: ANESTHESIOLOGY | Facility: HOSPITAL | Age: 59
End: 2024-09-11

## 2024-09-11 ENCOUNTER — ANESTHESIA (OUTPATIENT)
Dept: ANESTHESIOLOGY | Facility: HOSPITAL | Age: 59
End: 2024-09-11

## 2024-09-24 RX ORDER — SODIUM CHLORIDE, SODIUM LACTATE, POTASSIUM CHLORIDE, CALCIUM CHLORIDE 600; 310; 30; 20 MG/100ML; MG/100ML; MG/100ML; MG/100ML
75 INJECTION, SOLUTION INTRAVENOUS CONTINUOUS
Status: CANCELLED | OUTPATIENT
Start: 2024-09-24

## 2024-09-25 ENCOUNTER — HOSPITAL ENCOUNTER (OUTPATIENT)
Dept: GASTROENTEROLOGY | Facility: AMBULARY SURGERY CENTER | Age: 59
Setting detail: OUTPATIENT SURGERY
Discharge: HOME | End: 2024-09-25
Attending: INTERNAL MEDICINE
Payer: COMMERCIAL

## 2024-09-25 ENCOUNTER — ANESTHESIA (OUTPATIENT)
Dept: GASTROENTEROLOGY | Facility: AMBULARY SURGERY CENTER | Age: 59
End: 2024-09-25
Payer: COMMERCIAL

## 2024-09-25 VITALS
HEIGHT: 69 IN | BODY MASS INDEX: 30.24 KG/M2 | TEMPERATURE: 96.8 F | DIASTOLIC BLOOD PRESSURE: 93 MMHG | SYSTOLIC BLOOD PRESSURE: 115 MMHG | WEIGHT: 204.15 LBS | HEART RATE: 68 BPM | RESPIRATION RATE: 16 BRPM | OXYGEN SATURATION: 95 %

## 2024-09-25 DIAGNOSIS — K21.9 GASTROESOPHAGEAL REFLUX DISEASE, UNSPECIFIED WHETHER ESOPHAGITIS PRESENT: ICD-10-CM

## 2024-09-25 PROBLEM — E80.4 GILBERT DISEASE: Status: ACTIVE | Noted: 2024-09-25

## 2024-09-25 PROCEDURE — 43251 EGD REMOVE LESION SNARE: CPT | Performed by: INTERNAL MEDICINE

## 2024-09-25 PROCEDURE — 43239 EGD BIOPSY SINGLE/MULTIPLE: CPT | Performed by: INTERNAL MEDICINE

## 2024-09-25 PROCEDURE — 88305 TISSUE EXAM BY PATHOLOGIST: CPT | Performed by: SPECIALIST

## 2024-09-25 RX ORDER — SODIUM CHLORIDE, SODIUM LACTATE, POTASSIUM CHLORIDE, CALCIUM CHLORIDE 600; 310; 30; 20 MG/100ML; MG/100ML; MG/100ML; MG/100ML
INJECTION, SOLUTION INTRAVENOUS CONTINUOUS PRN
Status: DISCONTINUED | OUTPATIENT
Start: 2024-09-25 | End: 2024-09-25

## 2024-09-25 RX ORDER — PROPOFOL 10 MG/ML
INJECTION, EMULSION INTRAVENOUS AS NEEDED
Status: DISCONTINUED | OUTPATIENT
Start: 2024-09-25 | End: 2024-09-25

## 2024-09-25 RX ORDER — SODIUM CHLORIDE, SODIUM LACTATE, POTASSIUM CHLORIDE, CALCIUM CHLORIDE 600; 310; 30; 20 MG/100ML; MG/100ML; MG/100ML; MG/100ML
75 INJECTION, SOLUTION INTRAVENOUS CONTINUOUS
Status: DISCONTINUED | OUTPATIENT
Start: 2024-09-25 | End: 2024-09-29 | Stop reason: HOSPADM

## 2024-09-25 RX ORDER — LIDOCAINE HYDROCHLORIDE 10 MG/ML
INJECTION, SOLUTION EPIDURAL; INFILTRATION; INTRACAUDAL; PERINEURAL AS NEEDED
Status: DISCONTINUED | OUTPATIENT
Start: 2024-09-25 | End: 2024-09-25

## 2024-09-25 RX ADMIN — SODIUM CHLORIDE, SODIUM LACTATE, POTASSIUM CHLORIDE, AND CALCIUM CHLORIDE 75 ML/HR: .6; .31; .03; .02 INJECTION, SOLUTION INTRAVENOUS at 07:19

## 2024-09-25 RX ADMIN — PROPOFOL 50 MG: 10 INJECTION, EMULSION INTRAVENOUS at 08:19

## 2024-09-25 RX ADMIN — PROPOFOL 150 MG: 10 INJECTION, EMULSION INTRAVENOUS at 08:11

## 2024-09-25 RX ADMIN — SODIUM CHLORIDE, SODIUM LACTATE, POTASSIUM CHLORIDE, AND CALCIUM CHLORIDE: .6; .31; .03; .02 INJECTION, SOLUTION INTRAVENOUS at 08:05

## 2024-09-25 RX ADMIN — PROPOFOL 50 MG: 10 INJECTION, EMULSION INTRAVENOUS at 08:13

## 2024-09-25 RX ADMIN — LIDOCAINE HYDROCHLORIDE 100 MG: 10 INJECTION, SOLUTION EPIDURAL; INFILTRATION; INTRACAUDAL; PERINEURAL at 08:11

## 2024-09-25 RX ADMIN — PROPOFOL 50 MG: 10 INJECTION, EMULSION INTRAVENOUS at 08:16

## 2024-09-25 NOTE — ANESTHESIA POSTPROCEDURE EVALUATION
Post-Op Assessment Note    CV Status:  Stable  Pain Score: 0    Pain management: adequate       Mental Status:  Sleepy   Hydration Status:  Euvolemic   PONV Controlled:  Controlled   Airway Patency:  Patent     Post Op Vitals Reviewed: Yes    No anethesia notable event occurred.    Staff: CRNA               BP   108/68   Temp      Pulse 72   Resp 16   SpO2 94

## 2024-09-25 NOTE — H&P
"History and Physical -  [facility]  Paul Jennings 59 y.o. male MRN: [id number]    HPI: Paul Jennings is a 59 y.o. year old male who presents for evaluation of dysphagia symptoms, previously presented with food bolus.      Review of Systems    Historical Information   History reviewed. No pertinent past medical history.  Past Surgical History:   Procedure Laterality Date    CHOLECYSTECTOMY       Social History   Social History     Substance and Sexual Activity   Alcohol Use Yes     Social History     Substance and Sexual Activity   Drug Use Never     Social History     Tobacco Use   Smoking Status Never   Smokeless Tobacco Never     Family History   Problem Relation Age of Onset    Osteoarthritis Mother     Colon polyps Mother     Glaucoma Father     Heart attack Father     Skin cancer Father     Breast cancer Sister         stage 4       Meds/Allergies     Not in a hospital admission.    Allergies   Allergen Reactions    Iv Dye [Iodinated Contrast Media]      hives       Objective     /93   Pulse 68   Temp (!) 96.8 °F (36 °C) (Temporal)   Resp 16   Ht 5' 9\" (1.753 m)   Wt 92.6 kg (204 lb 2.3 oz)   SpO2 96%   BMI 30.15 kg/m²       PHYSICAL EXAM    Gen: NAD  CV: RRR  CHEST: Clear  ABD: soft, NT/ND  EXT: no edema  Neuro: AAO      ASSESSMENT/PLAN:  This is a 59 y.o. year old male here for evaluation of dysphagia symptoms.      PLAN:   Procedure: EGD.      "

## 2024-09-25 NOTE — ANESTHESIA PREPROCEDURE EVALUATION
Procedure:  EGD    Relevant Problems   CARDIO   (+) Coronary artery disease involving native coronary artery of native heart without angina pectoris   (+) Hypercholesterolemia (No meds)      GI/HEPATIC   (+) Esophageal reflux   (+) Other dysphagia      Other   (+) Gilbert disease (mild)        Physical Exam    Airway    Mallampati score: II  TM Distance: >3 FB  Neck ROM: full     Dental       Cardiovascular  Rhythm: regular, Rate: normal    Pulmonary   Breath sounds clear to auscultation    Other Findings        Anesthesia Plan  ASA Score- 2     Anesthesia Type- IV sedation with anesthesia with ASA Monitors.         Additional Monitors:     Airway Plan:            Plan Factors-    Chart reviewed.        Patient is not a current smoker.              Induction- intravenous.    Postoperative Plan-     Perioperative Resuscitation Plan - Level 1 - Full Code.       Informed Consent- Anesthetic plan and risks discussed with patient.  I personally reviewed this patient with the CRNA. Discussed and agreed on the Anesthesia Plan with the CRNA..

## 2024-09-27 PROCEDURE — 88305 TISSUE EXAM BY PATHOLOGIST: CPT | Performed by: SPECIALIST

## 2024-10-21 ENCOUNTER — TELEPHONE (OUTPATIENT)
Age: 59
End: 2024-10-21

## 2024-10-21 NOTE — TELEPHONE ENCOUNTER
Patients GI provider:  Dr. Aguilar    Number to return call: [phone]    Reason for call: Pt called to schedule a Manometry. Please reach out to pt to schedule    Scheduled procedure/appointment date if applicable: Apt/procedure N/A

## 2024-11-21 ENCOUNTER — TELEPHONE (OUTPATIENT)
Dept: GASTROENTEROLOGY | Facility: HOSPITAL | Age: 59
End: 2024-11-21

## 2024-12-03 ENCOUNTER — HOSPITAL ENCOUNTER (OUTPATIENT)
Dept: GASTROENTEROLOGY | Facility: HOSPITAL | Age: 59
Discharge: HOME | End: 2024-12-03
Attending: INTERNAL MEDICINE
Payer: COMMERCIAL

## 2024-12-03 VITALS
OXYGEN SATURATION: 98 % | TEMPERATURE: 98.1 F | RESPIRATION RATE: 18 BRPM | SYSTOLIC BLOOD PRESSURE: 142 MMHG | DIASTOLIC BLOOD PRESSURE: 99 MMHG | HEART RATE: 79 BPM

## 2024-12-03 DIAGNOSIS — R13.19 ESOPHAGEAL DYSPHAGIA: ICD-10-CM

## 2024-12-03 PROCEDURE — 91038 ESOPH IMPED FUNCT TEST > 1HR: CPT

## 2024-12-03 PROCEDURE — 91010 ESOPHAGUS MOTILITY STUDY: CPT

## 2024-12-03 NOTE — PERIOPERATIVE NURSING NOTE
Patient brought in the room and  was educated on procedure. Lidocaine 2% administered to bilateral nostrils by sniffing the Lidocaine up and through the nasal passages. Medication was allowed to take affect.  Manometry catheter placed via the left nostril,positioned and secured. 10 liquid swallows, 10 viscous swallow, 1 rapid swallow,5 upright swallows and 1 rapid drink challenge with 200cc of water tolerated over a 30 second time frame performed. Patient tolerated procedure and catheter removed intact. PH probe inserted via the left nostril and secured. Zephr recorder teach back performed and patient verbalized understanding. Patient instructed to return next day to have with the PH log sheet to have the probe removed.  Discharge instructions given and patient ambulated out of the room in stable condition.

## 2024-12-05 PROCEDURE — 91038 ESOPH IMPED FUNCT TEST > 1HR: CPT | Performed by: INTERNAL MEDICINE

## 2024-12-05 NOTE — PERIOPERATIVE NURSING NOTE
Patient called in and left a voice message in the manometry department to report his two meals during the 24hr ph procedure. He said his first meal day one at 2 pm was sushi a little spicy and taco at 7pm taco. For day two his breakfast was muffin with milk not spicy. He just wanted to report the type of meals he had.

## 2024-12-06 ENCOUNTER — RESULTS FOLLOW-UP (OUTPATIENT)
Dept: GASTROENTEROLOGY | Facility: CLINIC | Age: 59
End: 2024-12-06

## 2024-12-26 DIAGNOSIS — K21.9 GASTROESOPHAGEAL REFLUX DISEASE, UNSPECIFIED WHETHER ESOPHAGITIS PRESENT: ICD-10-CM

## 2024-12-27 RX ORDER — PANTOPRAZOLE SODIUM 40 MG/1
40 TABLET, DELAYED RELEASE ORAL
Qty: 60 TABLET | Refills: 5 | Status: SHIPPED | OUTPATIENT
Start: 2024-12-27

## 2025-02-27 DIAGNOSIS — K21.9 GASTROESOPHAGEAL REFLUX DISEASE, UNSPECIFIED WHETHER ESOPHAGITIS PRESENT: ICD-10-CM

## 2025-02-27 RX ORDER — PANTOPRAZOLE SODIUM 40 MG/1
40 TABLET, DELAYED RELEASE ORAL
Qty: 180 TABLET | Refills: 1 | Status: SHIPPED | OUTPATIENT
Start: 2025-02-27

## 2025-02-27 NOTE — TELEPHONE ENCOUNTER
Reason for call:   [x] Refill   [] Prior Auth  [x] Other: Pt going on vacation - asking for 90 days to be sent     Office:   [] PCP/Provider -   [x] Specialty/Provider - [facility]     Medication:     pantoprazole (PROTONIX) 40 mg 1 tablet 2 times daily          Ph[facility]: : [id number] [place]     Does the patient have enough for 3 days?   [] Yes   [x] No - Send as HP to POD

## 2025-04-01 ENCOUNTER — OFFICE VISIT (OUTPATIENT)
Dept: GASTROENTEROLOGY | Facility: AMBULARY SURGERY CENTER | Age: 60
End: 2025-04-01
Payer: COMMERCIAL

## 2025-04-01 VITALS
BODY MASS INDEX: 30.75 KG/M2 | DIASTOLIC BLOOD PRESSURE: 86 MMHG | HEIGHT: 69 IN | HEART RATE: 73 BPM | WEIGHT: 207.6 LBS | OXYGEN SATURATION: 98 % | SYSTOLIC BLOOD PRESSURE: 140 MMHG

## 2025-04-01 DIAGNOSIS — Z12.11 SCREENING FOR COLON CANCER: Primary | ICD-10-CM

## 2025-04-01 DIAGNOSIS — K21.9 GASTROESOPHAGEAL REFLUX DISEASE, UNSPECIFIED WHETHER ESOPHAGITIS PRESENT: ICD-10-CM

## 2025-04-01 DIAGNOSIS — Z83.719 FAMILY HISTORY OF COLONIC POLYPS: ICD-10-CM

## 2025-04-01 PROCEDURE — 99214 OFFICE O/P EST MOD 30 MIN: CPT | Performed by: PHYSICIAN ASSISTANT

## 2025-04-01 RX ORDER — PANTOPRAZOLE SODIUM 40 MG/1
40 TABLET, DELAYED RELEASE ORAL
Qty: 180 TABLET | Refills: 1 | Status: SHIPPED | OUTPATIENT
Start: 2025-04-01 | End: 2025-04-04 | Stop reason: SDUPTHER

## 2025-04-01 RX ORDER — SODIUM, POTASSIUM,MAG SULFATES 17.5-3.13G
1 SOLUTION, RECONSTITUTED, ORAL ORAL ONCE
Qty: 354 ML | Refills: 0 | Status: SHIPPED | OUTPATIENT
Start: 2025-04-01 | End: 2025-04-01

## 2025-04-01 RX ORDER — SODIUM CHLORIDE, SODIUM LACTATE, POTASSIUM CHLORIDE, CALCIUM CHLORIDE 600; 310; 30; 20 MG/100ML; MG/100ML; MG/100ML; MG/100ML
125 INJECTION, SOLUTION INTRAVENOUS CONTINUOUS
OUTPATIENT
Start: 2025-04-01

## 2025-04-01 RX ORDER — PANTOPRAZOLE SODIUM 40 MG/1
40 TABLET, DELAYED RELEASE ORAL
Qty: 180 TABLET | Refills: 1 | Status: SHIPPED | OUTPATIENT
Start: 2025-04-01 | End: 2025-04-01 | Stop reason: SDUPTHER

## 2025-04-01 NOTE — PATIENT INSTRUCTIONS
Scheduled date of colonoscopy (as of today): June 11, 2025  Physician performing colonoscopy: Dr. Aguilar   [facility]   Bowel prep reviewed with patient: Suprep   Instructions reviewed with patient by: SUNSHINE `  Clearances: n/a

## 2025-04-01 NOTE — ASSESSMENT & PLAN NOTE
S/p manometry which was normal and pH study which showed small amount of acid exposure time in upright position but was having some breakthrough episodes of acidic reflux on PPI. His dysphagia issue is infrequent, last had a piece of meat get stuck in January because he didn't chew well. He does get reflux if he bends over after eating a lot  -at this time would recommend trial of pantoprazole once daily since his symptoms are generally pretty well controlled.  -if his symptoms worsen, he should then resume BID PPI and we may need to consider a reflux corrective surgery to get him off high dose PPI  -anti-reflux diet and measures  -advised to update us on symptoms on once daily   -advised to chew food well, alternate liquids and solids.   Orders:    pantoprazole (PROTONIX) 40 mg tablet; Take 1 tablet (40 mg total) by mouth 2 (two) times a day before meals

## 2025-04-01 NOTE — PROGRESS NOTES
Name: Paul Jennings      : 1965      MRN: [id number]  Encounter Provider: Madison Holm PA-C  Encounter Date: 2025   Encounter department: [facility]  :  Assessment & Plan  Gastroesophageal reflux disease, unspecified whether esophagitis present  S/p manometry which was normal and pH study which showed small amount of acid exposure time in upright position but was having some breakthrough episodes of acidic reflux on PPI. His dysphagia issue is infrequent, last had a piece of meat get stuck in January because he didn't chew well. He does get reflux if he bends over after eating a lot  -at this time would recommend trial of pantoprazole once daily since his symptoms are generally pretty well controlled.  -if his symptoms worsen, he should then resume BID PPI and we may need to consider a reflux corrective surgery to get him off high dose PPI  -anti-reflux diet and measures  -advised to update us on symptoms on once daily   -advised to chew food well, alternate liquids and solids.   Orders:    pantoprazole (PROTONIX) 40 mg tablet; Take 1 tablet (40 mg total) by mouth 2 (two) times a day before meals    Screening for colon cancer  -last colonoscopy was 20 years ago, reports his mother had precancerous polyps several times and ended up having a part of her colon resected to prevent cancer  -recommend colonoscopy due to family hx over cologuard   -discussed procedure, risks including perforation, infection, and bleeding, missing a diagnosis, and benefits in detail with patient   -suprep  -no med holds  Orders:    Na Sulfate-K Sulfate-Mg Sulf 17.5-3.13-1.6 GM/177ML SOLN; Take 1 kit by mouth once for 1 dose    Colonoscopy; Future    Family history of colonic polyps  -colonoscopy as above   Orders:    Na Sulfate-K Sulfate-Mg Sulf 17.5-3.13-1.6 GM/177ML SOLN; Take 1 kit by mouth once for 1 dose    Colonoscopy; Future        History of Present Illness   Paul Jennings  is a 60 y.o. male with history of CAD on baby ASA, GERD, elevated cholesterol and family hx of colon polyps who presents for follow up. He had a pH and manometry study done in December. Manometry was normal. pH showed low acid exposure time in upright position but did have significant episodes of breakthrough reflux on PPI. Overall he reports his acid reflux symptoms are not that bad. He feels his symptoms are the same on BID pantoprazole as they were on once daily omeprazole. His last episode of food getting stuck was meat in January which he swallowed before finishing chewing and while talking. He had to bring it back up. Otherwise, denies dysphagia. He will get reflux if he is doing something that requires bending over after eating. Denies nausea or vomiting. Reports regular bowel habits. No blood in stool or black stool. Had a colonoscopy 20 years ago which was normal. Reports his mother had a hx of precancerous polyps that kept coming back so she ended up having a portion of her colon resected but didn't actually have colon cancer. He is about to leave on a 6 week trip to visit [place] to look for homes (plans on moving there) as well as visit his children in [place], [place] and [place]. He just retired and used to be an .   HPI    Review of Systems A complete review of systems is negative other than that noted above in the HPI.      Current Outpatient Medications   Medication Sig Dispense Refill    aspirin 81 MG tablet       latanoprost (XALATAN) 0.005 % ophthalmic solution place 1 drop INTO AFFECTED EYE(S) every evening      Na Sulfate-K Sulfate-Mg Sulf 17.5-3.13-1.6 GM/177ML SOLN Take 1 kit by mouth once for 1 dose 354 mL 0    pantoprazole (PROTONIX) 40 mg tablet Take 1 tablet (40 mg total) by mouth 2 (two) times a day before meals 180 tablet 1    erythromycin (ILOTYCIN) ophthalmic ointment Administer 1 inch to both eyes daily at bedtime (Patient not taking: Reported on 8/23/2023)      famotidine (PEPCID)  "20 mg tablet Take 1 tablet (20 mg total) by mouth 2 (two) times a day (Patient not taking: Reported on 8/23/2023) 60 tablet 0    fluticasone (FLONASE) 50 mcg/act nasal spray 1 spray into each nostril daily 9.9 mL 0    methocarbamol (ROBAXIN) 500 mg tablet Take 1 tablet (500 mg total) by mouth 4 (four) times a day 15 tablet 0    pimecrolimus (ELIDEL) 1 % cream Apply topically daily 30 g 1    tobramycin-dexamethasone (TOBRADEX) ophthalmic suspension Administer 1 drop to both eyes as needed (Patient not taking: Reported on 8/23/2023)      triamcinolone (KENALOG) 0.5 % cream Apply topically 2 (two) times a day 30 g 0     No current facility-administered medications for this visit.     Objective   /86 (BP Location: Left arm, Patient Position: Sitting, Cuff Size: Standard)   Pulse 73   Ht 5' 9\" (1.753 m)   Wt 94.2 kg (207 lb 9.6 oz)   SpO2 98%   BMI 30.66 kg/m²     Physical Exam  Constitutional:       General: He is not in acute distress.     Appearance: Normal appearance. He is well-developed.   HENT:      Head: Normocephalic and atraumatic.      Mouth/Throat:      Mouth: Mucous membranes are moist.   Eyes:      General: No scleral icterus.  Neck:      Trachea: No tracheal deviation.   Cardiovascular:      Rate and Rhythm: Normal rate and regular rhythm.      Heart sounds: Normal heart sounds. No murmur heard.  Pulmonary:      Effort: Pulmonary effort is normal. No respiratory distress.      Breath sounds: Normal breath sounds. No wheezing or rales.   Chest:      Chest wall: No tenderness.   Abdominal:      General: Bowel sounds are normal. There is no distension.      Palpations: Abdomen is soft. There is no mass.      Tenderness: There is no abdominal tenderness. There is no guarding or rebound.   Musculoskeletal:         General: Normal range of motion.      Cervical back: Normal range of motion and neck supple.   Skin:     General: Skin is warm and dry.      Coloration: Skin is not pale.      Findings: No " rash.   Neurological:      Mental Status: He is alert and oriented to person, place, and time. Mental status is at baseline.   Psychiatric:         Mood and Affect: Mood normal.         Behavior: Behavior normal.            Lab Results: I personally reviewed relevant lab results.       Results for orders placed during the hospital encounter of 09/25/24    EGD    Impression  C0M1 Cole's esophagus; performed four-quadrant cold forceps biopsy every 2 cm  The middle third of the esophagus appeared normal. Performed random biopsy to rule out eosinophilic esophagitis.  Moderate erythematous, granular mucosa with erosion in the antrum; performed cold forceps biopsy to rule out H. pylori  The duodenal bulb, 1st part of the duodenum and 2nd part of the duodenum appeared normal. Performed random biopsy.  1 cm type I hiatal hernia  Fundic gland polyp in the incisura was removed with cold snare      RECOMMENDATION:  Await pathology results    Follow biopsy results in 2 weeks.  Continue pantoprazole indefinitely given endoscopic concern for Cole's esophagus.  If biopsies are negative for EOE, would recommend esophageal motility testing to assess for any underlying dysmotility.  Avoid NSAIDs.  Continue to follow GERD diet.          Drea Aguilar MD

## 2025-04-04 DIAGNOSIS — K21.9 GASTROESOPHAGEAL REFLUX DISEASE, UNSPECIFIED WHETHER ESOPHAGITIS PRESENT: ICD-10-CM

## 2025-04-04 RX ORDER — PANTOPRAZOLE SODIUM 40 MG/1
40 TABLET, DELAYED RELEASE ORAL
Qty: 180 TABLET | Refills: 1 | Status: SHIPPED | OUTPATIENT
Start: 2025-04-04

## 2025-04-04 NOTE — TELEPHONE ENCOUNTER
Provider ordered the mediation but did not send to the pharmacy  Reason for call:   [x] Refill   [] Prior Auth  [] Other:     Office:   [] PCP/Provider -   [x] Specialty/Provider - [facility]  Authorized By: Madison Holm PA-C      Medication: pantoprazole (PROTONIX) 40 mg tablet    Dose/Frequency: Take 1 tablet (40 mg total) by mouth 2 (two) times a day before meals    Quantity: 180 tablet    Pharmacy: [id number] [place] [address] [facility]   Does the patient have enough for 3 days?   [] Yes   [x] No - Send as HP to POD    [facility] Away Pharmacy   Does the patient have enough for 10 days?   [] Yes   [] No - Send as HP to POD

## 2025-06-11 ENCOUNTER — ANESTHESIA EVENT (OUTPATIENT)
Dept: GASTROENTEROLOGY | Facility: AMBULARY SURGERY CENTER | Age: 60
End: 2025-06-11
Payer: COMMERCIAL

## 2025-06-11 ENCOUNTER — HOSPITAL ENCOUNTER (OUTPATIENT)
Dept: GASTROENTEROLOGY | Facility: AMBULARY SURGERY CENTER | Age: 60
Setting detail: OUTPATIENT SURGERY
Discharge: HOME | End: 2025-06-11
Attending: PHYSICIAN ASSISTANT
Payer: COMMERCIAL

## 2025-06-11 VITALS
HEART RATE: 66 BPM | DIASTOLIC BLOOD PRESSURE: 74 MMHG | WEIGHT: 195 LBS | RESPIRATION RATE: 20 BRPM | BODY MASS INDEX: 28.88 KG/M2 | SYSTOLIC BLOOD PRESSURE: 114 MMHG | TEMPERATURE: 98.2 F | HEIGHT: 69 IN | OXYGEN SATURATION: 100 %

## 2025-06-11 DIAGNOSIS — Z12.11 SCREENING FOR COLON CANCER: ICD-10-CM

## 2025-06-11 DIAGNOSIS — Z83.719 FAMILY HISTORY OF COLONIC POLYPS: ICD-10-CM

## 2025-06-11 PROCEDURE — 88305 TISSUE EXAM BY PATHOLOGIST: CPT | Performed by: PATHOLOGY

## 2025-06-11 PROCEDURE — 45380 COLONOSCOPY AND BIOPSY: CPT | Performed by: INTERNAL MEDICINE

## 2025-06-11 RX ORDER — SODIUM CHLORIDE, SODIUM LACTATE, POTASSIUM CHLORIDE, CALCIUM CHLORIDE 600; 310; 30; 20 MG/100ML; MG/100ML; MG/100ML; MG/100ML
125 INJECTION, SOLUTION INTRAVENOUS CONTINUOUS
Status: DISCONTINUED | OUTPATIENT
Start: 2025-06-11 | End: 2025-06-15 | Stop reason: HOSPADM

## 2025-06-11 RX ORDER — LIDOCAINE HYDROCHLORIDE 20 MG/ML
INJECTION, SOLUTION EPIDURAL; INFILTRATION; INTRACAUDAL; PERINEURAL AS NEEDED
Status: DISCONTINUED | OUTPATIENT
Start: 2025-06-11 | End: 2025-06-11

## 2025-06-11 RX ORDER — PROPOFOL 10 MG/ML
INJECTION, EMULSION INTRAVENOUS AS NEEDED
Status: DISCONTINUED | OUTPATIENT
Start: 2025-06-11 | End: 2025-06-11

## 2025-06-11 RX ADMIN — PROPOFOL 150 MG: 10 INJECTION, EMULSION INTRAVENOUS at 11:37

## 2025-06-11 RX ADMIN — Medication 40 MG: at 11:42

## 2025-06-11 RX ADMIN — LIDOCAINE HYDROCHLORIDE 100 MG: 20 INJECTION, SOLUTION EPIDURAL; INFILTRATION; INTRACAUDAL at 11:37

## 2025-06-11 RX ADMIN — PROPOFOL 120 MCG/KG/MIN: 10 INJECTION, EMULSION INTRAVENOUS at 11:39

## 2025-06-11 RX ADMIN — SODIUM CHLORIDE, SODIUM LACTATE, POTASSIUM CHLORIDE, AND CALCIUM CHLORIDE: .6; .31; .03; .02 INJECTION, SOLUTION INTRAVENOUS at 11:27

## 2025-06-11 NOTE — ANESTHESIA PREPROCEDURE EVALUATION
"Procedure:  COLONOSCOPY    Relevant Problems   ANESTHESIA (within normal limits)      CARDIO   (+) Coronary artery disease involving native coronary artery of native heart without angina pectoris   (+) Hypercholesterolemia      ENDO (within normal limits)      GI/HEPATIC   (+) Esophageal reflux   (+) Other dysphagia      /RENAL (within normal limits)      HEMATOLOGY (within normal limits)      NEURO/PSYCH (within normal limits)      PULMONARY (within normal limits)      Other Pediatrics   (+) Gilbert disease      Exercise Stress 12/2018:  -  Stress results: Duration of exercise was 13 min and 0 sec. Maximal work rate was 17.1 METs. Functional capacity was above normal (greater than 20%). Target heart rate was achieved. There was no chest pain during stress.  -  ECG conclusions: The stress ECG was negative for ischemia.     IMPRESSIONS: Normal study after maximal exercise.    Lab Results   Component Value Date    WBC 7.3 02/08/2023    HGB 15.2 02/08/2023    HCT 44.3 02/08/2023    MCV 85.0 02/08/2023     02/08/2023     Lab Results   Component Value Date    SODIUM 139 02/08/2023    K 4.6 02/08/2023     02/08/2023    CO2 24 02/08/2023    BUN 19 02/08/2023    CREATININE 1.20 02/08/2023    GLUC 93 02/08/2023    CALCIUM 9.4 02/08/2023     No results found for: \"INR\", \"PROTIME\"  No results found for: \"HGBA1C\"       Physical Exam    Airway     Mallampati score: II  TM Distance: >3 FB  Neck ROM: full      Cardiovascular  Cardiovascular exam normal    Dental       Pulmonary  Pulmonary exam normal     Neurological  - normal exam    Other Findings        Anesthesia Plan  ASA Score- 2     Anesthesia Type- IV sedation with anesthesia with ASA Monitors.         Additional Monitors:     Airway Plan: natural airway.           Plan Factors-Exercise tolerance (METS): >4 METS.    Chart reviewed. EKG reviewed.  Existing labs reviewed. Patient summary reviewed.                  Induction- intravenous.    Postoperative Plan- " .   Monitoring Plan - Monitoring plan - standard ASA monitoring          Informed Consent- Anesthetic plan and risks discussed with patient.  I personally reviewed this patient with the CRNA. Discussed and agreed on the Anesthesia Plan with the CRNA..      NPO Status:  Vitals Value Taken Time   Date of last liquid 06/11/25 06/11/25 10:42   Time of last liquid 0500 06/11/25 10:42   Date of last solid 06/10/25 06/11/25 10:42   Time of last solid 0700 06/11/25 10:42

## 2025-06-11 NOTE — H&P
"History and Physical -  [facility]  Paul Jennings 60 y.o. male MRN: [id number]    HPI: Paul Jennings is a 60 y.o. year old male who presents colon cancer screening evaluation.      Review of Systems    Historical Information   Past Medical History[1]  Past Surgical History[2]  Social History   Social History     Substance and Sexual Activity   Alcohol Use Yes    Alcohol/week: 8.0 standard drinks of alcohol    Types: 8 Cans of beer per week     Social History     Substance and Sexual Activity   Drug Use Never     Tobacco Use History[3]  Family History[4]    Meds/Allergies     Not in a hospital admission.    Allergies[5]    Objective     BP (!) 140/102   Pulse 82   Temp (!) 97 °F (36.1 °C) (Temporal)   Resp 16   Ht 5' 9\" (1.753 m)   Wt 88.5 kg (195 lb)   SpO2 99%   BMI 28.80 kg/m²       PHYSICAL EXAM    Gen: NAD  CV: RRR  CHEST: Clear  ABD: soft, NT/ND  EXT: no edema  Neuro: AAO      ASSESSMENT/PLAN:  This is a 60 y.o. year old male here for colon cancer screening evaluation.    PLAN:   Procedure: Colonoscopy.           [1] No past medical history on file.  [2]   Past Surgical History:  Procedure Laterality Date    CHOLECYSTECTOMY     [3]   Social History  Tobacco Use   Smoking Status Never   Smokeless Tobacco Never   [4]   Family History  Problem Relation Name Age of Onset    Osteoarthritis Mother      Colon polyps Mother      Glaucoma Father      Heart attack Father      Skin cancer Father      Breast cancer Sister          stage 4   [5]   Allergies  Allergen Reactions    Iv Dye [Iodinated Contrast Media]      hives     "

## 2025-06-11 NOTE — ANESTHESIA POSTPROCEDURE EVALUATION
Post-Op Assessment Note    CV Status:  Stable  Pain Score: 0    Pain management: adequate       Mental Status:  Awake and alert   Hydration Status:  Stable   PONV Controlled:  None   Airway Patency:  Patent and adequate     Post Op Vitals Reviewed: Yes    No anethesia notable event occurred.    Staff: CRNA, Anesthesiologist           Last Filed PACU Vitals:  Vitals Value Taken Time   Temp     Pulse 73    /87    Resp 16    SpO2 98

## 2025-06-18 ENCOUNTER — RESULTS FOLLOW-UP (OUTPATIENT)
Age: 60
End: 2025-06-18